# Patient Record
Sex: MALE | Race: BLACK OR AFRICAN AMERICAN | NOT HISPANIC OR LATINO | Employment: FULL TIME | URBAN - METROPOLITAN AREA
[De-identification: names, ages, dates, MRNs, and addresses within clinical notes are randomized per-mention and may not be internally consistent; named-entity substitution may affect disease eponyms.]

---

## 2017-01-16 DIAGNOSIS — N40.0 BENIGN PROSTATIC HYPERPLASIA, PRESENCE OF LOWER URINARY TRACT SYMPTOMS UNSPECIFIED, UNSPECIFIED MORPHOLOGY: Primary | ICD-10-CM

## 2017-01-16 DIAGNOSIS — I10 ESSENTIAL HYPERTENSION: ICD-10-CM

## 2017-01-16 RX ORDER — TAMSULOSIN HYDROCHLORIDE 0.4 MG/1
CAPSULE ORAL
Qty: 180 CAPSULE | Refills: 3 | Status: SHIPPED | OUTPATIENT
Start: 2017-01-16 | End: 2017-05-15 | Stop reason: SDUPTHER

## 2017-01-16 RX ORDER — VERAPAMIL HYDROCHLORIDE 180 MG/1
180 CAPSULE, EXTENDED RELEASE ORAL DAILY
Qty: 90 CAPSULE | Refills: 3 | Status: SHIPPED | OUTPATIENT
Start: 2017-01-16 | End: 2017-05-15 | Stop reason: SDUPTHER

## 2017-04-04 ENCOUNTER — TELEPHONE (OUTPATIENT)
Dept: FAMILY MEDICINE | Facility: CLINIC | Age: 72
End: 2017-04-04

## 2017-04-04 NOTE — TELEPHONE ENCOUNTER
Spoke with pt wife he is having surgery.   Went to eye specialist scheduled him for laser surgery because eye pressure is too high.

## 2017-04-04 NOTE — TELEPHONE ENCOUNTER
----- Message from Violette Donnelly sent at 4/4/2017  7:42 AM CDT -----  Please call pt 's wife Dena Mattson / [phone]  Asking to speak to nurse about scheduling an appointment for eye surgery ..(not cataract surgery) for 04/18 th

## 2017-04-07 ENCOUNTER — OFFICE VISIT (OUTPATIENT)
Dept: FAMILY MEDICINE | Facility: CLINIC | Age: 72
End: 2017-04-07
Payer: MEDICARE

## 2017-04-07 VITALS
RESPIRATION RATE: 16 BRPM | SYSTOLIC BLOOD PRESSURE: 110 MMHG | HEART RATE: 80 BPM | WEIGHT: 205.25 LBS | DIASTOLIC BLOOD PRESSURE: 70 MMHG | BODY MASS INDEX: 30.31 KG/M2

## 2017-04-07 DIAGNOSIS — Z01.818 PRE-OP EXAM: Primary | ICD-10-CM

## 2017-04-07 PROCEDURE — 1126F AMNT PAIN NOTED NONE PRSNT: CPT | Mod: S$GLB,,, | Performed by: NURSE PRACTITIONER

## 2017-04-07 PROCEDURE — 99213 OFFICE O/P EST LOW 20 MIN: CPT | Mod: S$GLB,,, | Performed by: NURSE PRACTITIONER

## 2017-04-07 PROCEDURE — 3074F SYST BP LT 130 MM HG: CPT | Mod: S$GLB,,, | Performed by: NURSE PRACTITIONER

## 2017-04-07 PROCEDURE — 1160F RVW MEDS BY RX/DR IN RCRD: CPT | Mod: S$GLB,,, | Performed by: NURSE PRACTITIONER

## 2017-04-07 PROCEDURE — 99499 UNLISTED E&M SERVICE: CPT | Mod: S$GLB,,, | Performed by: NURSE PRACTITIONER

## 2017-04-07 PROCEDURE — 3078F DIAST BP <80 MM HG: CPT | Mod: S$GLB,,, | Performed by: NURSE PRACTITIONER

## 2017-04-07 PROCEDURE — 99999 PR PBB SHADOW E&M-EST. PATIENT-LVL II: CPT | Mod: PBBFAC,,, | Performed by: NURSE PRACTITIONER

## 2017-04-07 PROCEDURE — 1159F MED LIST DOCD IN RCRD: CPT | Mod: S$GLB,,, | Performed by: NURSE PRACTITIONER

## 2017-04-07 PROCEDURE — 1157F ADVNC CARE PLAN IN RCRD: CPT | Mod: S$GLB,,, | Performed by: NURSE PRACTITIONER

## 2017-04-07 NOTE — PROGRESS NOTES
Subjective:       Patient ID: Jayro Mattson is a 71 y.o. male.    Chief Complaint: Pre-op Exam (eye laser surgery)    HPI Comments: Patient is having lazer eye surgery by Dr Estrada in [place] on 4/18/17. He is here for pre operative clearance. Patient is compliant with verapamil for htn, stable. He has BPH, compliant with listed medications, stable symptoms.  TETANUS VACCINE due on 08/10/1965    Past Medical History:  Past Medical History:  No date: Allergy  No date: BPH (benign prostatic hyperplasia)  No date: Diverticulitis  3/17/2006: Diverticulosis      Comment: Seen at colonoscopy.  No date: Elevated PSA  No date: Glaucoma  No date: HEARING LOSS  No date: Hypertension  No date: Kidney stone  Past Surgical History:  3/17/2006  G[facility]: COLONOSCOPY      Comment: Diverticulosis.  Anal papilla(e) were                hypertrophied.   4/16/2012  [facility]: COLONOSCOPY      Comment: Diverticulosis.  Hypertrophied anal papilla.  No date: CYSTOSCOPY  No date: EYE SURGERY      Comment: bilateral PHACO with IOL  No date: GLAUCOMA SURGERY      Comment: x 3/ one with cataract surgery  Review of patient's allergies indicates:   -- No known allergies   Current Outpatient Prescriptions on File Prior to Visit:  bimatoprost (LUMIGAN) 0.03 % ophthalmic drops, Place 1 drop into both eyes nightly. Every evening, Disp: , Rfl:   brimonidine (ALPHAGAN) 0.2 % ophthalmic solution, Place 1 drop into both eyes 3 (three) times daily. Three times a day, Disp: , Rfl:   cholecalciferol, vitamin D3, (VITAMIN D-3) 1,000 unit Chew, Take 1 tablet by mouth once daily. Every day, Disp: , Rfl:   coenzyme Q10 (CO Q-10) 100 mg capsule, Take 1 capsule by mouth 2 (two) times daily. Every day, Disp: , Rfl:   dorzolamide-timolol (COSOPT) 2-0.5 % ophthalmic solution, Place 1 drop into both eyes 2 (two) times daily. Three times a day, Disp: , Rfl:   fish oil-omega-3 fatty acids 300-1,000 mg capsule, Take 2 g by mouth once daily., Disp: , Rfl:   latanoprost  0.005 % ophthalmic solution, , Disp: , Rfl:   pilocarpine (PILOCAR) 4 % ophthalmic solution, Place 1 drop into both eyes 3 (three) times daily. Three times a day, Disp: , Rfl:   tadalafil (CIALIS) 20 MG tablet, Take 1 tablet (20 mg total) by mouth daily as needed for Erectile Dysfunction., Disp: 6 tablet, Rfl: 4  tamsulosin (FLOMAX) 0.4 mg Cp24, TAKE 2 CAPSULES NIGHTLY, Disp: 180 capsule, Rfl: 3  verapamil (VERELAN) 180 MG C24P, Take 1 capsule (180 mg total) by mouth once daily., Disp: 90 capsule, Rfl: 3    No current facility-administered medications on file prior to visit.     Social History    Marital status:              Spouse name:                       Years of education:                 Number of children: 3             Occupational History  Occupation          Employer            Comment               Nursery ([employer] [facility]     Social History Main Topics    Smoking status: Never Smoker                                                                Smokeless status: Never Used                        Alcohol use: No              Drug use: No              Sexual activity: Yes               Partners with: Female    Other Topics            Concern    None on file    Social History Narrative    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 He and his wife are very aware of the need for ongoing glaucoma care.      Review of patient's family history indicates:    Diabetes                       Mother                    Diabetes                       Father                    Heart disease                  Brother                   Diabetes                       Sister                          Review of Systems   Constitutional: Negative.  Negative for chills and fever.   HENT: Negative.  Negative for postnasal drip, rhinorrhea and sinus pressure.    Eyes: Positive for visual disturbance. Negative for pain, discharge and itching.   Respiratory: Negative.  Negative for cough, shortness of breath  and wheezing.    Cardiovascular: Negative.  Negative for chest pain, palpitations and leg swelling.   Gastrointestinal: Negative for abdominal pain, constipation, diarrhea, nausea and vomiting.   Genitourinary: Negative.  Negative for dysuria, frequency and urgency.   Musculoskeletal: Negative.    Skin: Negative.  Negative for rash.   Neurological: Negative for dizziness, light-headedness and headaches.   Hematological: Negative.  Negative for adenopathy. Does not bruise/bleed easily.       Objective:      Physical Exam   Constitutional: He is oriented to person, place, and time. No distress.   HENT:   Head: Normocephalic and atraumatic.   Right Ear: External ear normal.   Left Ear: External ear normal.   Nose: Nose normal.   Mouth/Throat: Oropharynx is clear and moist. No oropharyngeal exudate.   Eyes: Right eye exhibits no discharge. Left eye exhibits no discharge.   Neck: Normal range of motion.   Cardiovascular: Normal rate and regular rhythm.  Exam reveals no friction rub.    No murmur heard.  Pulmonary/Chest: Effort normal and breath sounds normal. No respiratory distress. He has no wheezes.   Abdominal: Soft. Bowel sounds are normal. He exhibits no distension. There is no tenderness.   Musculoskeletal: He exhibits no edema.   Neurological: He is alert and oriented to person, place, and time.   Skin: No rash noted. He is not diaphoretic. No erythema.   Psychiatric: He has a normal mood and affect. His behavior is normal.   Vitals reviewed.      Assessment:       1. Pre-op exam        Plan:       Stable exam. Cleared for local/mac anesthesia. Clearance faxed to surgeon.

## 2017-05-15 ENCOUNTER — OFFICE VISIT (OUTPATIENT)
Dept: FAMILY MEDICINE | Facility: CLINIC | Age: 72
End: 2017-05-15
Payer: MEDICARE

## 2017-05-15 VITALS
SYSTOLIC BLOOD PRESSURE: 132 MMHG | OXYGEN SATURATION: 98 % | BODY MASS INDEX: 28.77 KG/M2 | HEART RATE: 79 BPM | WEIGHT: 205.5 LBS | HEIGHT: 71 IN | DIASTOLIC BLOOD PRESSURE: 82 MMHG | RESPIRATION RATE: 18 BRPM

## 2017-05-15 DIAGNOSIS — I10 ESSENTIAL HYPERTENSION: ICD-10-CM

## 2017-05-15 DIAGNOSIS — R97.20 ELEVATED PSA: Chronic | ICD-10-CM

## 2017-05-15 DIAGNOSIS — N40.0 BENIGN PROSTATIC HYPERPLASIA, PRESENCE OF LOWER URINARY TRACT SYMPTOMS UNSPECIFIED, UNSPECIFIED MORPHOLOGY: ICD-10-CM

## 2017-05-15 DIAGNOSIS — Z00.00 ENCOUNTER FOR PREVENTIVE HEALTH EXAMINATION: Primary | ICD-10-CM

## 2017-05-15 DIAGNOSIS — H40.9 GLAUCOMA, UNSPECIFIED GLAUCOMA, UNSPECIFIED LATERALITY: Chronic | ICD-10-CM

## 2017-05-15 PROCEDURE — 99999 PR PBB SHADOW E&M-EST. PATIENT-LVL IV: CPT | Mod: PBBFAC,,, | Performed by: NURSE PRACTITIONER

## 2017-05-15 PROCEDURE — 3075F SYST BP GE 130 - 139MM HG: CPT | Mod: S$GLB,,, | Performed by: NURSE PRACTITIONER

## 2017-05-15 PROCEDURE — G0439 PPPS, SUBSEQ VISIT: HCPCS | Mod: S$GLB,,, | Performed by: NURSE PRACTITIONER

## 2017-05-15 PROCEDURE — 99499 UNLISTED E&M SERVICE: CPT | Mod: S$GLB,,, | Performed by: NURSE PRACTITIONER

## 2017-05-15 PROCEDURE — 3079F DIAST BP 80-89 MM HG: CPT | Mod: S$GLB,,, | Performed by: NURSE PRACTITIONER

## 2017-05-15 RX ORDER — TAMSULOSIN HYDROCHLORIDE 0.4 MG/1
CAPSULE ORAL
Qty: 180 CAPSULE | Refills: 3 | Status: SHIPPED | OUTPATIENT
Start: 2017-05-15 | End: 2018-07-02 | Stop reason: SDUPTHER

## 2017-05-15 RX ORDER — VERAPAMIL HYDROCHLORIDE 180 MG/1
180 CAPSULE, EXTENDED RELEASE ORAL DAILY
Qty: 90 CAPSULE | Refills: 3 | Status: SHIPPED | OUTPATIENT
Start: 2017-05-15 | End: 2018-07-02 | Stop reason: SDUPTHER

## 2017-05-15 NOTE — MR AVS SNAPSHOT
[facility]  [address]  [place] [address]  Phone: [phone]  Fax: [phone]                  Jayro Mattson   5/15/2017 3:00 PM   Office Visit    Description:  Male : 1945   Provider:  BECK REILLY   Department:  [facility]           Reason for Visit     Health Risk Assessment           Diagnoses this Visit        Comments    Encounter for preventive health examination    -  Primary     Benign prostatic hyperplasia, presence of lower urinary tract symptoms unspecified, unspecified morphology         Essential hypertension         Elevated PSA         Glaucoma, unspecified glaucoma, unspecified laterality                To Do List           Goals (5 Years of Data)              Today    4/7/17    11/10/16    Blood Pressure < 130/80   132/82  132/82  132/82       These Medications        Disp Refills Start End    tamsulosin (FLOMAX) 0.4 mg Cp24 180 capsule 3 5/15/2017     TAKE 2 CAPSULES NIGHTLY    Pharmacy: [facility] [address] Ph #: [phone]       verapamil (VERELAN) 180 MG C24P 90 capsule 3 5/15/2017     Take 1 capsule (180 mg total) by mouth once daily. - Oral    Pharmacy: [facility] [address] Ph #: [phone]         [facility]     [facility] -  Assistance  Unless otherwise directed by your provider, please contact [facility], our nurse care line that is available for  assistance.     Registered nurses in the [facility] provide: appointment scheduling, clinical advisement, health education, and other advisory services.  Call: [phone] (toll free)               Medications           Message regarding Medications     Verify the changes and/or additions to your medication regime listed below are the same as discussed with your clinician today.  If any of these changes or additions are  "incorrect, please notify your healthcare provider.             Verify that the below list of medications is an accurate representation of the medications you are currently taking.  If none reported, the list may be blank. If incorrect, please contact your healthcare provider. Carry this list with you in case of emergency.           Current Medications     bimatoprost (LUMIGAN) 0.03 % ophthalmic drops Place 1 drop into both eyes nightly. Every evening    brimonidine (ALPHAGAN) 0.2 % ophthalmic solution Place 1 drop into both eyes 3 (three) times daily. Three times a day    cholecalciferol, vitamin D3, (VITAMIN D-3) 1,000 unit Chew Take 1 tablet by mouth once daily. Every day    coenzyme Q10 (CO Q-10) 100 mg capsule Take 1 capsule by mouth 2 (two) times daily. Every day    dorzolamide-timolol (COSOPT) 2-0.5 % ophthalmic solution Place 1 drop into both eyes 2 (two) times daily. Three times a day    fish oil-omega-3 fatty acids 300-1,000 mg capsule Take 2 g by mouth once daily.    latanoprost 0.005 % ophthalmic solution     pilocarpine (PILOCAR) 4 % ophthalmic solution Place 1 drop into both eyes 3 (three) times daily. Three times a day    tadalafil (CIALIS) 20 MG tablet Take 1 tablet (20 mg total) by mouth daily as needed for Erectile Dysfunction.    tamsulosin (FLOMAX) 0.4 mg Cp24 TAKE 2 CAPSULES NIGHTLY    verapamil (VERELAN) 180 MG C24P Take 1 capsule (180 mg total) by mouth once daily.           Clinical Reference Information           Your Vitals Were     BP Pulse Resp Height Weight SpO2    132/82 79 18 5' 11" (1.803 m) 93.2 kg (205 lb 7.5 oz) 98%    BMI                28.66 kg/m2          Blood Pressure          Most Recent Value    BP  132/82      Allergies as of 5/15/2017     No Known Allergies      Immunizations Administered on Date of Encounter - 5/15/2017     None      Instructions      Counseling and Referral of Other Preventative  (Italic type indicates deductible and co-insurance are waived)    Patient " Name: Jayro Mattson  Today's Date: 5/15/2017      SERVICE LIMITATIONS RECOMMENDATION    Vaccines    · Pneumococcal (once after 65)    · Influenza (annually)    · Hepatitis B (if medium/high risk)    · Prevnar 13      Hepatitis B medium/high risk factors:       - End-stage renal disease       - Hemophiliacs who received Factor VII or         IX concentrates       - Clients of institutions for the mentally             retarded       - Persons who live in the same house as          a HepB carrier       - Homosexual men       - Illicit injectable drug abusers     Pneumococcal: Done, no repeat necessary     Influenza: Done, repeat in one year     Hepatitis B: N/A     Prevnar 13: Done, no repeat necessary    Prostate cancer screening (annually to age 75)     Prostate specific antigen (PSA) Shared decision making with Provider. Sometimes a co-pay may be required if the patient decides to have this test. The USPSTF no longer recommends prostate cancer screening routinely in medicine: not to follow    Colorectal cancer screening (to age 75)    · Fecal occult blood test (annual)  · Flexible sigmoidoscopy (5y)  · Screening colonoscopy (10y)  · Barium enema   Last done 2012, recommend to repeat every 8 years  2020    Diabetes self-management training (no USPSTF recommendations)  Requires referral by treating physician for patient with diabetes or renal disease. 10 hours of initial DSMT sessions of no less than 30 minutes each in a continuous 12-month period. 2 hours of follow-up DSMT in subsequent years.  N/A    Glaucoma screening (no USPSTF recommendation)  Diabetes mellitus, family history   , age 50 or over    American, age 65 or over  Last done 5/2017, recommend to repeat every month  6/2017    Medical nutrition therapy for diabetes or renal disease (no recommended schedule)  Requires referral by treating physician for patient with diabetes or renal disease or kidney transplant within the past 3  years.  Can be provided in same year as diabetes self-management training (DSMT), and CMS recommends medical nutrition therapy take place after DSMT. Up to 3 hours for initial year and 2 hours in subsequent years.  N/A    Cardiovascular screening blood tests (every 5 years)  · Fasting lipid panel  Order as a panel if possible  Last done 2015, recommend to repeat every 5 years  2020    Diabetes screening tests (at least every 3 years, Medicare covers annually or at 6-month intervals for prediabetic patients)  · Fasting blood sugar (FBS) or glucose tolerance test (GTT)  Patient must be diagnosed with one of the following:       - Hypertension       - Dyslipidemia       - Obesity (BMI 30kg/m2)       - Previous elevated impaired FBS or GTT       ... or any two of the following:       - Overweight (BMI 25 but <30)       - Family history of diabetes       - Age 65 or older       - History of gestational diabetes or birth of baby weighing more than 9 pounds  Done this year, repeat every year    Abdominal aortic aneurysm screening (once)  · Sonogram   Limited to patients who meet one of the following criteria:       - Men who are 65-75 years old and have smoked more than 100 cigarette in their lifetime       - Anyone with a family history of abdominal aortic aneurysm       - Anyone recommended for screening by the USPSTF  N/A    HIV screening (annually for increased risk patients)  · HIV-1 and HIV-2 by EIA, or [place], rapid antibody test or oral mucosa transudate  Patients must be at increased risk for HIV infection per USPSTF guidelines or pregnant. Tests covered annually for patient at increased risk or as requested by the patient. Pregnant patients may receive up to 3 tests during pregnancy.  Risks discussed, screening is not recommended    Smoking cessation counseling (up to 8 sessions per year)  Patients must be asymptomatic of tobacco-related conditions to receive as a preventative service.  Non-smoker    Subsequent  annual wellness visit  At least 12 months since last AWV  Return in one year     The following information is provided to all patients.  This information is to help you find resources for any of the problems found today that may be affecting your health:                Living healthy guide: www.[facility].[place].[facility]      Understanding Diabetes: www.diabetes.org      Eating healthy: www.cdc.gov/healthyweight      CDC home safety checklist: www.cdc.gov/steadi/patient.html      Agency on Aging: www.goea.[place].[facility]      Alcoholics anonymous (AA): www.aa.org      Physical Activity: www.riki.nih.gov/qo6brxz      Tobacco use: www.quitwithusla.org          Language Assistance Services     ATTENTION: Language assistance services are available, free of charge. Please call [phone].      ATENCIÓN: Si lesla jamie, tiene a gleason disposición servicios gratuitos de asistencia lingüística. Llame al [phone].     CHÚ Ý: N?u b?n nói Ti?ng Vi?t, có các d?ch v? h? tr? ngôn ng? mi?n phí dành cho b?n. G?i s? [phone].         [facility] complies with applicable Federal civil rights laws and does not discriminate on the basis of race, color, national origin, age, disability, or sex.

## 2017-05-15 NOTE — PROGRESS NOTES
"Jayro Mattson presented for a  Medicare AWV and comprehensive Health Risk Assessment today. The following components were reviewed and updated:    · Medical history  · Family History  · Social history  · Allergies and Current Medications  · Health Risk Assessment  · Health Maintenance  · Care Team     Review of Systems   Constitutional: Negative for chills, fever and malaise/fatigue.   Eyes: Positive for blurred vision and double vision.   Respiratory: Negative for cough, shortness of breath and wheezing.    Cardiovascular: Negative for chest pain, palpitations and leg swelling.   Gastrointestinal: Negative for abdominal pain, blood in stool, diarrhea, nausea and vomiting.   Skin: Negative for rash.   Neurological: Negative for dizziness, weakness and headaches.       ** See Completed Assessments for Annual Wellness Visit within the encounter summary.**       The following assessments were completed:  · Living Situation  · CAGE  · Depression Screening  · Timed Get Up and Go  · Whisper Test  · Cognitive Function Screening  · Nutrition Screening  · ADL Screening  · PAQ Screening    Vitals:    05/15/17 1500   BP: 132/82   Pulse: 79   Resp: 18   SpO2: 98%   Weight: 93.2 kg (205 lb 7.5 oz)   Height: 5' 11" (1.803 m)     Body mass index is 28.66 kg/(m^2).  Physical Exam   Constitutional: He is oriented to person, place, and time. He appears well-nourished.   Cardiovascular: Normal rate, regular rhythm, normal heart sounds and intact distal pulses.    Pulmonary/Chest: Effort normal and breath sounds normal. He has no wheezes. He has no rales.   Abdominal: Soft. Bowel sounds are normal. There is no tenderness.   Neurological: He is alert and oriented to person, place, and time.   Skin: Skin is warm and dry. No rash noted.   Vitals reviewed.        Diagnoses and health risks identified today and associated recommendations/orders:    1. Encounter for preventive health examination  Written rx given to patient to update " tetanus  Discussed preventative recommendations    2. Benign prostatic hyperplasia, presence of lower urinary tract symptoms unspecified, unspecified morphology  Stable- continue current treatment  - tamsulosin (FLOMAX) 0.4 mg Cp24; TAKE 2 CAPSULES NIGHTLY  Dispense: 180 capsule; Refill: 3    3. Essential hypertension  Stable- continue current treatment  - verapamil (VERELAN) 180 MG C24P; Take 1 capsule (180 mg total) by mouth once daily.  Dispense: 90 capsule; Refill: 3    4. Elevated PSA  Stable- PSA 10-/2016 (6.8)  Follow up routinely    5. Glaucoma, unspecified glaucoma, unspecified laterality  Continue follow up with Dr. Jnaice Estrada as scheduled.     Provided Jayro with a 5-10 year written screening schedule and personal prevention plan. Recommendations were developed using the USPSTF age appropriate recommendations. Education, counseling, and referrals were provided as needed. After Visit Summary printed and given to patient which includes a list of additional screenings\tests needed.      Caitlin Lora NP

## 2017-05-15 NOTE — PATIENT INSTRUCTIONS
Counseling and Referral of Other Preventative  (Italic type indicates deductible and co-insurance are waived)    Patient Name: Jayro Mattson  Today's Date: 5/15/2017      SERVICE LIMITATIONS RECOMMENDATION    Vaccines    · Pneumococcal (once after 65)    · Influenza (annually)    · Hepatitis B (if medium/high risk)    · Prevnar 13      Hepatitis B medium/high risk factors:       - End-stage renal disease       - Hemophiliacs who received Factor VII or         IX concentrates       - Clients of institutions for the mentally             retarded       - Persons who live in the same house as          a HepB carrier       - Homosexual men       - Illicit injectable drug abusers     Pneumococcal: Done, no repeat necessary     Influenza: Done, repeat in one year     Hepatitis B: N/A     Prevnar 13: Done, no repeat necessary    Prostate cancer screening (annually to age 75)     Prostate specific antigen (PSA) Shared decision making with Provider. Sometimes a co-pay may be required if the patient decides to have this test. The USPSTF no longer recommends prostate cancer screening routinely in medicine: not to follow    Colorectal cancer screening (to age 75)    · Fecal occult blood test (annual)  · Flexible sigmoidoscopy (5y)  · Screening colonoscopy (10y)  · Barium enema   Last done 2012, recommend to repeat every 8 years  2020    Diabetes self-management training (no USPSTF recommendations)  Requires referral by treating physician for patient with diabetes or renal disease. 10 hours of initial DSMT sessions of no less than 30 minutes each in a continuous 12-month period. 2 hours of follow-up DSMT in subsequent years.  N/A    Glaucoma screening (no USPSTF recommendation)  Diabetes mellitus, family history   , age 50 or over    American, age 65 or over  Last done 5/2017, recommend to repeat every month  6/2017    Medical nutrition therapy for diabetes or renal disease (no recommended schedule)   Requires referral by treating physician for patient with diabetes or renal disease or kidney transplant within the past 3 years.  Can be provided in same year as diabetes self-management training (DSMT), and CMS recommends medical nutrition therapy take place after DSMT. Up to 3 hours for initial year and 2 hours in subsequent years.  N/A    Cardiovascular screening blood tests (every 5 years)  · Fasting lipid panel  Order as a panel if possible  Last done 2015, recommend to repeat every 5 years  2020    Diabetes screening tests (at least every 3 years, Medicare covers annually or at 6-month intervals for prediabetic patients)  · Fasting blood sugar (FBS) or glucose tolerance test (GTT)  Patient must be diagnosed with one of the following:       - Hypertension       - Dyslipidemia       - Obesity (BMI 30kg/m2)       - Previous elevated impaired FBS or GTT       ... or any two of the following:       - Overweight (BMI 25 but <30)       - Family history of diabetes       - Age 65 or older       - History of gestational diabetes or birth of baby weighing more than 9 pounds  Done this year, repeat every year    Abdominal aortic aneurysm screening (once)  · Sonogram   Limited to patients who meet one of the following criteria:       - Men who are 65-75 years old and have smoked more than 100 cigarette in their lifetime       - Anyone with a family history of abdominal aortic aneurysm       - Anyone recommended for screening by the USPSTF  N/A    HIV screening (annually for increased risk patients)  · HIV-1 and HIV-2 by EIA, or [place], rapid antibody test or oral mucosa transudate  Patients must be at increased risk for HIV infection per USPSTF guidelines or pregnant. Tests covered annually for patient at increased risk or as requested by the patient. Pregnant patients may receive up to 3 tests during pregnancy.  Risks discussed, screening is not recommended    Smoking cessation counseling (up to 8 sessions per year)   Patients must be asymptomatic of tobacco-related conditions to receive as a preventative service.  Non-smoker    Subsequent annual wellness visit  At least 12 months since last AWV  Return in one year     The following information is provided to all patients.  This information is to help you find resources for any of the problems found today that may be affecting your health:                Living healthy guide: www.[facility].[place].[facility]      Understanding Diabetes: www.diabetes.org      Eating healthy: www.cdc.gov/healthyweight      CDC home safety checklist: www.cdc.gov/steadi/patient.html      Agency on Aging: www.goea.[place].[facility]      Alcoholics anonymous (AA): www.aa.org      Physical Activity: www.riki.nih.gov/je2mduu      Tobacco use: www.quitwithusla.org

## 2018-05-15 ENCOUNTER — OFFICE VISIT (OUTPATIENT)
Dept: FAMILY MEDICINE | Facility: CLINIC | Age: 73
End: 2018-05-15
Payer: MEDICARE

## 2018-05-15 VITALS
BODY MASS INDEX: 27.9 KG/M2 | DIASTOLIC BLOOD PRESSURE: 72 MMHG | WEIGHT: 199.31 LBS | HEIGHT: 71 IN | OXYGEN SATURATION: 98 % | SYSTOLIC BLOOD PRESSURE: 118 MMHG | HEART RATE: 72 BPM

## 2018-05-15 DIAGNOSIS — H40.9 GLAUCOMA, UNSPECIFIED GLAUCOMA TYPE, UNSPECIFIED LATERALITY: Chronic | ICD-10-CM

## 2018-05-15 DIAGNOSIS — I10 ESSENTIAL HYPERTENSION: Chronic | ICD-10-CM

## 2018-05-15 DIAGNOSIS — R97.20 ELEVATED PSA: Chronic | ICD-10-CM

## 2018-05-15 DIAGNOSIS — Z00.00 ENCOUNTER FOR PREVENTIVE HEALTH EXAMINATION: Primary | ICD-10-CM

## 2018-05-15 PROCEDURE — G0439 PPPS, SUBSEQ VISIT: HCPCS | Mod: S$GLB,,, | Performed by: NURSE PRACTITIONER

## 2018-05-15 PROCEDURE — 3074F SYST BP LT 130 MM HG: CPT | Mod: CPTII,S$GLB,, | Performed by: NURSE PRACTITIONER

## 2018-05-15 PROCEDURE — 99999 PR PBB SHADOW E&M-EST. PATIENT-LVL IV: CPT | Mod: PBBFAC,,, | Performed by: NURSE PRACTITIONER

## 2018-05-15 PROCEDURE — 3078F DIAST BP <80 MM HG: CPT | Mod: CPTII,S$GLB,, | Performed by: NURSE PRACTITIONER

## 2018-05-15 PROCEDURE — 99499 UNLISTED E&M SERVICE: CPT | Mod: S$PBB,,, | Performed by: NURSE PRACTITIONER

## 2018-05-15 RX ORDER — LUTEIN 10 MG
TABLET ORAL
COMMUNITY
End: 2020-08-26

## 2018-05-15 NOTE — PROGRESS NOTES
"Jayro Mattson presented for a  Medicare AWV and comprehensive Health Risk Assessment today. The following components were reviewed and updated:    · Medical history  · Family History  · Social history  · Allergies and Current Medications  · Health Risk Assessment  · Health Maintenance  · Care Team     ** See Completed Assessments for Annual Wellness Visit within the encounter summary.**       The following assessments were completed:  · Living Situation  · CAGE  · Depression Screening  · Timed Get Up and Go  · Whisper Test  · Cognitive Function Screening  · Nutrition Screening  · ADL Screening  · PAQ Screening    Vitals:    05/15/18 1006   BP: 118/72   BP Location: Left arm   Patient Position: Sitting   BP Method: Medium (Manual)   Pulse: 72   SpO2: 98%   Weight: 90.4 kg (199 lb 4.7 oz)   Height: 5' 11" (1.803 m)     Body mass index is 27.8 kg/m².  Physical Exam   Constitutional: He appears well-nourished. No distress.   HENT:   Head: Normocephalic and atraumatic.   Eyes: Conjunctivae are normal.   Severely impaired vision   Cardiovascular: Normal rate, regular rhythm and normal heart sounds.    Pulmonary/Chest: Effort normal and breath sounds normal. No respiratory distress.   Neurological: He is alert.   Skin: Skin is warm.   Psychiatric: He has a normal mood and affect. His behavior is normal.         Diagnoses and health risks identified today and associated recommendations/orders:    1. Encounter for preventive health examination  Reviewed health maintenance and provided recommendations        2. Glaucoma, unspecified glaucoma type, unspecified laterality  Follow ophthalmology recommendations   Followed by Sean Thornton).      3. Essential hypertension  Stable.     Followed by Franko Salas Jr, MD .      4. Elevated PSA  Continue to monitor   Followed by Franko Salas Jr, MD .        Provided Jayro with a 5-10 year written screening schedule and personal prevention plan. Recommendations were " developed using the USPSTF age appropriate recommendations. Education, counseling, and referrals were provided as needed. After Visit Summary printed and given to patient which includes a list of additional screenings\tests needed.    Follow-up in about 1 year (around 5/15/2019).    Mely Triana NP

## 2018-07-02 DIAGNOSIS — N40.0 BENIGN PROSTATIC HYPERPLASIA: ICD-10-CM

## 2018-07-02 DIAGNOSIS — I10 ESSENTIAL HYPERTENSION: ICD-10-CM

## 2018-07-09 RX ORDER — VERAPAMIL HYDROCHLORIDE 180 MG/1
CAPSULE, EXTENDED RELEASE ORAL
Qty: 90 CAPSULE | Refills: 3 | Status: SHIPPED | OUTPATIENT
Start: 2018-07-09 | End: 2019-03-20 | Stop reason: SDUPTHER

## 2018-07-09 RX ORDER — TAMSULOSIN HYDROCHLORIDE 0.4 MG/1
CAPSULE ORAL
Qty: 180 CAPSULE | Refills: 3 | Status: SHIPPED | OUTPATIENT
Start: 2018-07-09 | End: 2019-03-20 | Stop reason: SDUPTHER

## 2018-08-02 ENCOUNTER — OFFICE VISIT (OUTPATIENT)
Dept: FAMILY MEDICINE | Facility: CLINIC | Age: 73
End: 2018-08-02
Payer: MEDICARE

## 2018-08-02 VITALS
SYSTOLIC BLOOD PRESSURE: 104 MMHG | DIASTOLIC BLOOD PRESSURE: 70 MMHG | BODY MASS INDEX: 27.44 KG/M2 | WEIGHT: 196 LBS | OXYGEN SATURATION: 96 % | HEART RATE: 65 BPM | HEIGHT: 71 IN

## 2018-08-02 DIAGNOSIS — I10 ESSENTIAL HYPERTENSION: Primary | Chronic | ICD-10-CM

## 2018-08-02 DIAGNOSIS — R97.20 ELEVATED PSA: Chronic | ICD-10-CM

## 2018-08-02 DIAGNOSIS — R42 EPISODIC LIGHTHEADEDNESS: ICD-10-CM

## 2018-08-02 DIAGNOSIS — Z01.818 PRE-OP EXAM: ICD-10-CM

## 2018-08-02 LAB
BLOOD PRESSURE MONITORING: NORMAL

## 2018-08-02 PROCEDURE — 3078F DIAST BP <80 MM HG: CPT | Mod: CPTII,S$GLB,, | Performed by: NURSE PRACTITIONER

## 2018-08-02 PROCEDURE — 99999 PR PBB SHADOW E&M-EST. PATIENT-LVL IV: CPT | Mod: PBBFAC,,, | Performed by: NURSE PRACTITIONER

## 2018-08-02 PROCEDURE — 3074F SYST BP LT 130 MM HG: CPT | Mod: CPTII,S$GLB,, | Performed by: NURSE PRACTITIONER

## 2018-08-02 PROCEDURE — 99214 OFFICE O/P EST MOD 30 MIN: CPT | Mod: S$GLB,,, | Performed by: NURSE PRACTITIONER

## 2018-08-02 PROCEDURE — 93000 ELECTROCARDIOGRAM COMPLETE: CPT | Mod: S$GLB,,, | Performed by: INTERNAL MEDICINE

## 2018-08-02 RX ORDER — PILOCARPINE HYDROCHLORIDE 20 MG/ML
SOLUTION/ DROPS OPHTHALMIC
COMMUNITY
Start: 2018-06-04 | End: 2019-11-21

## 2018-08-02 RX ORDER — DUREZOL 0.5 MG/ML
EMULSION OPHTHALMIC
COMMUNITY
Start: 2018-07-14 | End: 2023-05-18

## 2018-08-02 NOTE — PROGRESS NOTES
Subjective:       Patient ID: Jayro Mattson is a 72 y.o. male.    Chief Complaint: Pre-op Exam    Patient is here for pre operative clearance for eye surgery, levator ptosis repair scheduled on August 20.   Patient does notice dizziness when standing still and certain head position changes for months. Does not happen sitting or lying, does not happen when he is walking. Room does not spin but feels as if he is off balance. He says this is happeneing every day, some days frequently some days only once or twice.   No labs since 2016. Bp is on low end today, he does not monitor Bp at home, no recent medication changes.   TETANUS VACCINE due on 08/10/1965  Influenza Vaccine due on 08/01/2018    Past Medical History:  Past Medical History:  No date: Allergy  No date: BPH (benign prostatic hyperplasia)  No date: Diverticulitis  3/17/2006: Diverticulosis      Comment: Seen at colonoscopy.  No date: Elevated PSA  No date: Glaucoma  No date: HEARING LOSS  No date: Hypertension  No date: Kidney stone  Past Surgical History:  3/17/2006  [facility]: COLONOSCOPY      Comment: Diverticulosis.  Anal papilla(e) were                hypertrophied.   4/16/2012  [facility]: COLONOSCOPY      Comment: Diverticulosis.  Hypertrophied anal papilla.  No date: CYSTOSCOPY  No date: EYE SURGERY      Comment: bilateral PHACO with IOL  No date: GLAUCOMA SURGERY      Comment: x 3/ one with cataract surgery  Review of patient's allergies indicates:   -- No known allergies   Current Outpatient Prescriptions on File Prior to Visit:  bimatoprost (LUMIGAN) 0.03 % ophthalmic drops, Place 1 drop into both eyes nightly. Every evening, Disp: , Rfl:   brimonidine (ALPHAGAN) 0.2 % ophthalmic solution, Place 1 drop into both eyes 3 (three) times daily. Three times a day, Disp: , Rfl:   cholecalciferol, vitamin D3, (VITAMIN D-3) 1,000 unit Chew, Take 1 tablet by mouth once daily. Every day, Disp: , Rfl:   coenzyme Q10 (CO Q-10) 100 mg capsule, Take 1 capsule by mouth 2  (two) times daily. Every day, Disp: , Rfl:   dorzolamide-timolol (COSOPT) 2-0.5 % ophthalmic solution, Place 1 drop into both eyes 2 (two) times daily. Three times a day, Disp: , Rfl:   fish oil-omega-3 fatty acids 300-1,000 mg capsule, Take 2 g by mouth once daily., Disp: , Rfl:   latanoprost 0.005 % ophthalmic solution, , Disp: , Rfl:   lutein 10 mg Tab, Take by mouth., Disp: , Rfl:   magnesium oxide-Mg AA chelate (MG-PLUS-PROTEIN) 133 mg Tab, Take by mouth., Disp: , Rfl:   pilocarpine (PILOCAR) 4 % ophthalmic solution, Place 1 drop into both eyes 3 (three) times daily. Three times a day, Disp: , Rfl:   tadalafil (CIALIS) 20 MG tablet, Take 1 tablet (20 mg total) by mouth daily as needed for Erectile Dysfunction., Disp: 6 tablet, Rfl: 4  tamsulosin (FLOMAX) 0.4 mg Cp24, TAKE 2 CAPSULES NIGHTLY, Disp: 180 capsule, Rfl: 3  verapamil (VERELAN) 180 MG C24P, TAKE 1 CAPSULE ONE TIME DAILY, Disp: 90 capsule, Rfl: 3    No current facility-administered medications on file prior to visit.     Social History    Marital status:              Spouse name:                       Years of education:                 Number of children: 3             Occupational History  Occupation          Employer            Comment               Nursery ([employer]     Social History Main Topics    Smoking status: Never Smoker                                                                Smokeless tobacco: Never Used                        Alcohol use: No              Drug use: No              Sexual activity: Yes               Partners with: Female    Other Topics            Concern    None on file    Social History Narrative    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 He and his wife are very aware of the need for ongoing glaucoma care.      Review of patient's family history indicates:  Problem: Diabetes      Relation: Mother       Age of Onset: (Not Specified)   Problem: Diabetes      Relation: Father       Age  of Onset: (Not Specified)   Problem: Heart disease      Relation: Brother       Age of Onset: (Not Specified)   Problem: Diabetes      Relation: Sister       Age of Onset: (Not Specified)             Review of Systems   Constitutional: Negative.  Negative for fatigue, fever and unexpected weight change.   HENT: Positive for postnasal drip. Negative for rhinorrhea, sinus pain, sinus pressure and sore throat.    Respiratory: Negative.  Negative for cough, shortness of breath and wheezing.    Cardiovascular: Negative.  Negative for chest pain, palpitations and leg swelling.   Gastrointestinal: Negative.  Negative for abdominal pain, constipation, diarrhea, nausea and vomiting.   Genitourinary: Negative.  Negative for dysuria, frequency and urgency.   Musculoskeletal: Negative.  Negative for arthralgias, back pain and neck pain.   Skin: Negative.  Negative for rash and wound.   Neurological: Positive for dizziness, light-headedness and headaches. Negative for tremors and weakness.       Objective:      Physical Exam   Constitutional: He is oriented to person, place, and time. No distress.   HENT:   Head: Normocephalic and atraumatic.   Eyes: Pupils are equal, round, and reactive to light.   Cardiovascular: Normal rate and regular rhythm.  Exam reveals no friction rub.    No murmur heard.  Pulmonary/Chest: Effort normal and breath sounds normal. No respiratory distress. He has no wheezes.   Abdominal: Soft. Bowel sounds are normal.   Musculoskeletal: He exhibits no edema.   Neurological: He is alert and oriented to person, place, and time.   Skin: He is not diaphoretic. No erythema.   Psychiatric: He has a normal mood and affect. His behavior is normal.   Vitals reviewed.      Assessment:       1. Essential hypertension    2. Elevated PSA    3. Episodic lightheadedness    4. Pre-op exam        Plan:       1. Essential hypertension  Stable. Orthostatic Bps are stable. Continue current medication for now, labs  scheduled.  - CBC auto differential; Future  - Comprehensive metabolic panel; Future  - TSH; Future  - Lipid panel; Future  - IN OFFICE EKG 12-LEAD (to Muse)    2. Elevated PSA  Due for PSA.  - PROSTATE SPECIFIC ANTIGEN, DIAGNOSTIC; Future    3. Episodic lightheadedness  Orthostatic vital signs stable. Refer to ENt for evaluation.   - Ambulatory referral to ENT  - Orthostatic vital signs    4. Pre-op exam  Labs scheduled, ecg stable. Will clear for procedure once labs are reviewed.   - CBC auto differential; Future  - Comprehensive metabolic panel; Future  - IN OFFICE EKG 12-LEAD (to Muse)

## 2018-08-07 ENCOUNTER — LAB VISIT (OUTPATIENT)
Dept: LAB | Facility: HOSPITAL | Age: 73
End: 2018-08-07
Attending: NURSE PRACTITIONER
Payer: MEDICARE

## 2018-08-07 DIAGNOSIS — Z01.818 PRE-OP EXAM: ICD-10-CM

## 2018-08-07 DIAGNOSIS — I10 ESSENTIAL HYPERTENSION: Chronic | ICD-10-CM

## 2018-08-07 DIAGNOSIS — R97.20 ELEVATED PSA: Chronic | ICD-10-CM

## 2018-08-07 LAB
ALBUMIN SERPL BCP-MCNC: 3.7 G/DL
ALP SERPL-CCNC: 87 U/L
ALT SERPL W/O P-5'-P-CCNC: 11 U/L
ANION GAP SERPL CALC-SCNC: 7 MMOL/L
AST SERPL-CCNC: 16 U/L
BASOPHILS # BLD AUTO: 0.05 K/UL
BASOPHILS NFR BLD: 1.2 %
BILIRUB SERPL-MCNC: 0.4 MG/DL
BUN SERPL-MCNC: 24 MG/DL
CALCIUM SERPL-MCNC: 9.4 MG/DL
CHLORIDE SERPL-SCNC: 108 MMOL/L
CHOLEST SERPL-MCNC: 186 MG/DL
CHOLEST/HDLC SERPL: 3.6 {RATIO}
CO2 SERPL-SCNC: 27 MMOL/L
COMPLEXED PSA SERPL-MCNC: 6 NG/ML
CREAT SERPL-MCNC: 1 MG/DL
DIFFERENTIAL METHOD: ABNORMAL
EOSINOPHIL # BLD AUTO: 0.2 K/UL
EOSINOPHIL NFR BLD: 3.7 %
ERYTHROCYTE [DISTWIDTH] IN BLOOD BY AUTOMATED COUNT: 13.3 %
EST. GFR  (AFRICAN AMERICAN): >60 ML/MIN/1.73 M^2
EST. GFR  (NON AFRICAN AMERICAN): >60 ML/MIN/1.73 M^2
GLUCOSE SERPL-MCNC: 93 MG/DL
HCT VFR BLD AUTO: 38.3 %
HDLC SERPL-MCNC: 51 MG/DL
HDLC SERPL: 27.4 %
HGB BLD-MCNC: 12.8 G/DL
IMM GRANULOCYTES # BLD AUTO: 0.01 K/UL
IMM GRANULOCYTES NFR BLD AUTO: 0.2 %
LDLC SERPL CALC-MCNC: 124.2 MG/DL
LYMPHOCYTES # BLD AUTO: 1.2 K/UL
LYMPHOCYTES NFR BLD: 29.6 %
MCH RBC QN AUTO: 30.1 PG
MCHC RBC AUTO-ENTMCNC: 33.4 G/DL
MCV RBC AUTO: 90 FL
MONOCYTES # BLD AUTO: 0.4 K/UL
MONOCYTES NFR BLD: 9.4 %
NEUTROPHILS # BLD AUTO: 2.3 K/UL
NEUTROPHILS NFR BLD: 55.9 %
NONHDLC SERPL-MCNC: 135 MG/DL
NRBC BLD-RTO: 0 /100 WBC
PLATELET # BLD AUTO: 158 K/UL
PMV BLD AUTO: 11.6 FL
POTASSIUM SERPL-SCNC: 4.5 MMOL/L
PROT SERPL-MCNC: 7.5 G/DL
RBC # BLD AUTO: 4.25 M/UL
SODIUM SERPL-SCNC: 142 MMOL/L
TRIGL SERPL-MCNC: 54 MG/DL
TSH SERPL DL<=0.005 MIU/L-ACNC: 1.45 UIU/ML
WBC # BLD AUTO: 4.05 K/UL

## 2018-08-07 PROCEDURE — 85025 COMPLETE CBC W/AUTO DIFF WBC: CPT

## 2018-08-07 PROCEDURE — 84443 ASSAY THYROID STIM HORMONE: CPT

## 2018-08-07 PROCEDURE — 84153 ASSAY OF PSA TOTAL: CPT

## 2018-08-07 PROCEDURE — 80061 LIPID PANEL: CPT

## 2018-08-07 PROCEDURE — 36415 COLL VENOUS BLD VENIPUNCTURE: CPT | Mod: PO

## 2018-08-07 PROCEDURE — 80053 COMPREHEN METABOLIC PANEL: CPT

## 2018-08-15 ENCOUNTER — CLINICAL SUPPORT (OUTPATIENT)
Dept: AUDIOLOGY | Facility: CLINIC | Age: 73
End: 2018-08-15
Payer: MEDICARE

## 2018-08-15 ENCOUNTER — OFFICE VISIT (OUTPATIENT)
Dept: OTOLARYNGOLOGY | Facility: CLINIC | Age: 73
End: 2018-08-15
Payer: MEDICARE

## 2018-08-15 VITALS — BODY MASS INDEX: 27.44 KG/M2 | WEIGHT: 196 LBS | HEIGHT: 71 IN

## 2018-08-15 DIAGNOSIS — R42 DIZZINESS AND GIDDINESS: Primary | ICD-10-CM

## 2018-08-15 DIAGNOSIS — R26.89 IMBALANCE: Primary | ICD-10-CM

## 2018-08-15 PROCEDURE — 99213 OFFICE O/P EST LOW 20 MIN: CPT | Mod: S$GLB,,, | Performed by: OTOLARYNGOLOGY

## 2018-08-15 PROCEDURE — 99999 PR PBB SHADOW E&M-EST. PATIENT-LVL III: CPT | Mod: PBBFAC,,, | Performed by: OTOLARYNGOLOGY

## 2018-08-15 PROCEDURE — 92542 POSITIONAL NYSTAGMUS TEST: CPT | Mod: HCWC,S$GLB,, | Performed by: AUDIOLOGIST

## 2018-08-15 PROCEDURE — 99999 PR PBB SHADOW E&M-EST. PATIENT-LVL I: CPT | Mod: PBBFAC,HCWC,,

## 2018-08-15 NOTE — LETTER
August 15, 2018      [place]  [address]  [place] [address]           [place]  [address] [facility] [address] [place] [address]  Phone: [phone]  Fax: [phone]          Patient: Jayro Mattson   MR Number: [id number]   YOB: 1945   Date of Visit: 8/15/2018       Dear Avis Stoner:    Thank you for referring Jayro Mattson to me for evaluation. Attached you will find relevant portions of my assessment and plan of care.    If you have questions, please do not hesitate to call me. I look forward to following Jayro Mattson along with you.    Sincerely,    Han Monroy MD    Enclosure  CC:  No Recipients    If you would like to receive this communication electronically, please contact [email or web address] or [phone] to request more information on [facility] Link access.    For providers and/or their staff who would like to refer a patient to [facility], please contact us through our one-stop-shop provider referral line, [facility] , at [phone].    If you feel you have received this communication in error or would no longer like to receive these types of communications, please e-mail [email or web address]

## 2018-08-15 NOTE — PROGRESS NOTES
Subjective:       Patient ID: Jayro Mattson is a 73 y.o. male.    Chief Complaint: Dizziness    Jayro is here for dizzinesss/ motion issues. Symptoms have been present for months. Gradual onset. No preceding injury / MVA / inciting factors.   This is constant. He notices an imbalance issue when he is standing or walking. Difficult to maintain balance. When he closes eyes, things are worse.   No DM, he denies any sensory disturbances distally, but he has occasional  / hand strength issues where his hand will lock up and occasionally with his toes.  Glaucoma but this has been stable. Hearing is gradually getting worse. Previous hearing loss noted bilateral symmetric in 2016.     No tremors.        Social History     Tobacco Use   Smoking Status Never Smoker   Smokeless Tobacco Never Used     Social History     Substance and Sexual Activity   Alcohol Use No          Review of Systems   Constitutional: Negative for activity change and appetite change.   Eyes: Negative for discharge.   Respiratory: Negative for difficulty breathing and wheezing   Cardiovascular: Negative for chest pain.   Gastrointestinal: Negative for abdominal distention and abdominal pain.   Endocrine: Negative for cold intolerance and heat intolerance.   Genitourinary: Negative for dysuria.   Musculoskeletal: Negative for gait problem and joint swelling.   Skin: Negative for color change and pallor.   Neurological: Negative for syncope and weakness.   Psychiatric/Behavioral: Negative for agitation and confusion.     Objective:        Constitutional:   He is oriented to person, place, and time. He appears well-developed and well-nourished. He appears alert. He is active. Normal speech.      Head:  Normocephalic and atraumatic. Head is without TMJ tenderness. No scars. Salivary glands normal.  Facial strength is normal.      Ears:    Right Ear: No drainage or swelling. No middle ear effusion.   Left Ear: No drainage or swelling.  No middle ear  effusion.   Right Edwin-Hallpike - no vertigo, no rotary nystagmus   Left Edwin-Hallpike - no vertigo, no rotary nystagmus   Horizontal Canal testing negative  VAST testing negative      Nose:  No mucosal edema, rhinorrhea or sinus tenderness. No turbinate hypertrophy.      Mouth/Throat  Oropharynx clear and moist without lesions or asymmetry, normal uvula midline and mirror exam normal. Normal dentition. No uvula swelling, lacerations or trismus. No oropharyngeal exudate. Tonsillar erythema, tonsillar exudate.      Neck:  Full range of motion with neck supple and no adenopathy. Thyroid tenderness is present. No tracheal deviation, no edema, no erythema, normal range of motion, no stridor, no crepitus and no neck rigidity present. No thyroid mass present.     Cardiovascular:   Intact distal pulses and normal pulses.      Pulmonary/Chest:   Effort normal and breath sounds normal. No stridor.     Psychiatric:   His speech is normal and behavior is normal. His mood appears not anxious. His affect is not labile.     Neurological:   He is alert and oriented to person, place, and time. No sensory deficit.     Skin:   No abrasions, lacerations, lesions, or rashes. No abrasion and no bruising noted.         Tests / Results:  none    Assessment:       1. Dizziness and giddiness          Plan:       Suspect proprioception issue either related to normal aging or neurologic issue, but the remainder of his neuro exam is unremarkable and no suspicion for neurologic problem at this point.   No inner ear cause suspected.  Vestibular PT  Follow- up 2-3 months  Consider Neurology consult if not improved for eval of peripheral nerves

## 2018-08-31 ENCOUNTER — CLINICAL SUPPORT (OUTPATIENT)
Dept: REHABILITATION | Facility: HOSPITAL | Age: 73
End: 2018-08-31
Attending: OTOLARYNGOLOGY
Payer: MEDICARE

## 2018-08-31 DIAGNOSIS — R26.89 BALANCE PROBLEM: ICD-10-CM

## 2018-08-31 DIAGNOSIS — R29.898 WEAKNESS OF BOTH LOWER EXTREMITIES: ICD-10-CM

## 2018-08-31 PROCEDURE — G8978 MOBILITY CURRENT STATUS: HCPCS | Mod: CJ,PO | Performed by: PHYSICAL THERAPIST

## 2018-08-31 PROCEDURE — 97161 PT EVAL LOW COMPLEX 20 MIN: CPT | Mod: PO | Performed by: PHYSICAL THERAPIST

## 2018-08-31 PROCEDURE — 97110 THERAPEUTIC EXERCISES: CPT | Mod: PO | Performed by: PHYSICAL THERAPIST

## 2018-08-31 PROCEDURE — G8979 MOBILITY GOAL STATUS: HCPCS | Mod: CJ,PO | Performed by: PHYSICAL THERAPIST

## 2018-08-31 NOTE — PLAN OF CARE
[facility]  Physical Therapy Initial Evaluation    Name: Jayro Mattson  Clinic Number: [id number]    Therapy Diagnosis:   Encounter Diagnoses   Name Primary?    Balance problem     Weakness of both lower extremities      Physician: Han Monroy MD    Physician Orders: PT Eval and Treat   Medical Diagnosis from Referral: dizziness and giddiness  Evaluation Date: 8/31/2018  Authorization Period Expiration: 12/31/18  Plan of Care Expiration: 11/30/18  Visit # / Visits authorized: 1/ 20    Time In: 8:00  Time Out: 9:00  Total Billable Time: 60 minutes    Precautions: Standard    Subjective   Date of onset: 4 months ago  History of current condition - Jayro reports: first becoming aware of unsteadiness with standing still 3-4 months ago. He states that he doesn't feel dizzy or light headed. He just feels that he is swaying forward and backwards when he stands still.  He feels steady with walking and moving however. Pt has impaired eyesight and is legally blind since 2008.  He also has some hearing impairment.  He states his eyesight has worsened in last 6 months. He is not diabetic and denies any neuropathy.  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       Past Medical History:   Diagnosis Date    Allergy     BPH (benign prostatic hyperplasia)     Diverticulitis     Diverticulosis 3/17/2006    Seen at colonoscopy.    Elevated PSA     Glaucoma     HEARING LOSS     Hypertension     Kidney stone      Jayro Mattson  has a past surgical history that includes Glaucoma surgery; Eye surgery; Colonoscopy (3/17/2006  [facility]); Colonoscopy (4/16/2012  [facility]); Cystoscopy; BIOPSY, PROSTATE (Bilateral, 4/16/2012); CYSTOSCOPY (N/A, 4/16/2012); and COLONOSCOPY (N/A, 4/16/2012).    Jayro has a current medication list which includes the following prescription(s): bimatoprost, brimonidine 0.2%, cholecalciferol (vitamin d3), coenzyme q10,  dorzolamide-timolol 2-0.5%, durezol, fish oil-omega-3 fatty acids, latanoprost, lutein, magnesium oxide-mg aa chelate, pilocarpine hcl 4%, pilocarpine hcl 2%, tadalafil, tamsulosin, and verapamil.    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    Gait: Pt ambulates without assistive device, but is vision impaired so needs some guidance. Good step through gait.    Strength   Right LE Left LE   Hip flexion 4/5 4/5   Hip extension 4/5 4/5   Hip abduction 4/5 4/5   Hip adduction 4/5 4/5   Knee flexion 4/5 4/5   Knee extension 4/5 4/5   Ankle dorsiflexion 4/5 4/5   Ankle plantarflexion 4/5 4/5   Ankle eversion 4/5 4/5   Ankle inversion 4/5 4/5   Great toe extension 5/5 5/5     Abdominals 4/5 Transversus Abdominus      Sensation: all peripheral nerves and dermatomes intact and equal bilaterally.    Special Tests    Murphy Balance Test  Murphy Balance Scale    1. SITTING TO STANDIN - able to stand without using hands and stabilize independently   2. STANDING UNSUPPORTED: 2 - able to stand 30 seconds unsupported   3. SITTING WITH BACK UNSUPPORTED BUT FEET SUPPORTED ON FLOOR OR ON A STOOL: 4 - able to sit safely and securely for 2 minutes   4. STANDING TO SITTING: 3 - controls descent by using hands   5. TRANSFERS: 4 - able to transfer safely with minor use of hands   6. STANDING UNSUPPORTED WITH EYES CLOSED: 3 - able to stand 10 seconds with supervision   7. STANDING  UNSUPPORTED WITH FEET TOGETHER: 3 - able to place feet together independently and stand 1 minute with supervision   8. REACHING FORWARD WITH OUTSTRETCHED ARM WHILE STANDING: 3 - can reach forward 12 cm (5 inches)   9.  OBJECT FROM THE FLOOR FROM A STANDING POSITION: 3 - able to  slipper but needs supervision   10. TURNING TO LOOK BEHIND OVER LEFT AND RIGHT SHOULDERS WHILE STANDIN - looks behind from both sides and weight shifts well   11. TURN 360 DEGREES: 2 - able to turn 360 degrees safely but slowly   12. PLACE ALTERNATE FOOT ON STEP OR STOOL WHILE STANDING UNSUPPORTED: 3 - able to stand independently and complete 8 steps in > 20 seconds   13. STANDING UNSUPPORTED ONE FOOT IN FRONT: 4 - able to place foot tandem independently and hold 30 seconds   14. STANDING ON ONE LE - able to lift leg independently and hold > 10 seconds     TOTAL SCORE: 46/56    41-56 = low fall risk  21-40 = moderate fall risk  0 -20 = high fall risk    Vestibular occular reflex: No sign of difficulty or disturbance.  [place] Hallpike negative bilaterally.         CMS Impairment/Limitation/Restriction for FOTO neuro Survey    Therapist reviewed FOTO scores for Jayro Mattson on 2018.   FOTO documents entered into [employer] - see Media section.    Limitation Score:   Category: Mobility    Current : CJ = at least 20% but < 40% impaired, limited or restricted  Goal: CJ = at least 20% but < 40% impaired, limited or restricted         TREATMENT   Treatment Time In: 8:30  Treatment Time Out: 9:00  Total Treatment time separate from Evaluation: 30 minutes    Jayro received therapeutic exercises to develop strength, ROM and balance and proprioception for 30 minutes including:  Standing feet together: eyes ope, eyes closed, on foam 30 sec ea  Standing tandem stance: eyes open, eyes closed, on foam 30 sec ea  Standing single leg balance: eyes open, eyes closed, on foam 30 sec ea  March in place: eyes open, eyes closed 30 sec  ea  Heel raises x 20  Toe raises x 20  Recumbent bike 10 min    Home Exercises and Patient Education Provided    Education provided:   - Pt instructed in HEP and precautions around home and work.    Written Home Exercises Provided: yes.  Exercises were reviewed and Jayro was able to demonstrate them prior to the end of the session.  Jayro demonstrated good  understanding of the education provided.     See EMR under Patient Instructions for exercises provided 8/31/2018.    Assessment   Jayro is a 73 y.o. male referred to outpatient Physical Therapy with a medical diagnosis of dizziness. Pt presents with mild balance disturbance which is much more apparent with eyes closed.  He actually can balance well with eyes open, but when vision is removed, he has significant sway.  Suspect that greater loss of vision in last 6 months has contributed to his sensation of unsteadiness with standing in last 3-4 months.    Pt prognosis is Excellent.   Pt will benefit from skilled outpatient Physical Therapy to address the deficits stated above and in the chart below, provide pt/family education, and to maximize pt's level of independence.     Plan of care discussed with patient: Yes  Pt's spiritual, cultural and educational needs considered and patient is agreeable to the plan of care and goals as stated below:     Anticipated Barriers for therapy: transportation needs    Medical Necessity is demonstrated by the following  History  Co-morbidities and personal factors that may impact the plan of care Co-morbidities:   HTN, vision problems    Personal Factors:   no deficits     low   Examination  Body Structures and Functions, activity limitations and participation restrictions that may impact the plan of care Body Regions:   head  neck  lower extremities  trunk    Body Systems:    gross symmetry  ROM  strength  balance  gait  transitions    Participation Restrictions:   none    Activity limitations:   Learning and applying  knowledge  no deficits    General Tasks and Commands  no deficits    Communication  no deficits    Mobility  walking    Self care  no deficits    Domestic Life  no deficits    Interactions/Relationships  no deficits    Life Areas  no deficits    Community and Social Life  no deficits         low   Clinical Presentation stable and uncomplicated low   Decision Making/ Complexity Score: low     Goals:  Short Term Goals: 4 weeks   Pt will have less sway with standing to improve steadiness with standing and gait.  Pt to improve Tinetti score by 3 percentage points.  Pt able to balance single leg with eyes closed for 10 seconds.    Long Term Goals: 8 weeks   Pt able to balance single leg with eyes closed for 20 seconds  5/5 strength bilateral LEs for improved steadiness with standing.  Pt improves Tinetti score by 5 percentage points.    Plan   Plan of care Certification: 8/31/2018 to 11/30/18.    Outpatient Physical Therapy 2 times weekly for 8 weeks to include the following interventions: Gait Training, Neuromuscular Re-ed, Patient Education and Therapeutic Exercise.     Lamar Streeter, PT

## 2018-08-31 NOTE — PATIENT INSTRUCTIONS
Static Balance: Rhomberg position on floor    Standing with your feet together and arms across your chest, practice keeping your balance with your eyes open. Stand next to a wall or stable surface for balance support if needed. 30 seconds    Do the same exercise with your eyes closed to make it harder.          TANDEM STANCE WITH SUPPORT     front of a chair, table or counter top for support. Then place the heel of one foot so that it is touching the toes of the other foot. Maintain your balance in this position.  30 seconds with eyes open, 30 seconds with eyes closed        SINGLE LEG STANCE - SLS    Stand on one leg and maintain your balance. 30 seconds with eyes open, 30 seconds with eyes closed        Marching in Place    March in place slowly, with high knees. 30 seconds with eyes open. 30 seconds with eyes closed.        STANDING HEEL RAISES    While standing, raise up on your toes as you lift your heels off the ground. 20 times        TOES RAISES - DORSIFLEXION STANDING    In a standing position with your feet on the ground, raise up your forefoot and toes as you bend at your ankle.   20 times

## 2018-09-04 NOTE — PROGRESS NOTES
Physical Therapy Daily Treatment Note     Name: Jayro Mattson  [facility] Number: [id number]    Therapy Diagnosis:   Encounter Diagnoses   Name Primary?    Balance problem     Weakness of both lower extremities      Physician: Han Monroy MD    Visit Date: 9/5/2018  Physician Orders: PT Eval and Treat   Medical Diagnosis from Referral: dizziness and giddiness  Evaluation Date: 8/31/2018  Authorization Period Expiration: 12/31/18  Plan of Care Expiration: 11/30/18  Visit # / Visits authorized: 2/ 20    Time In: 0700  Time Out: 0745  Total Billable Time: 40 minutes    Precautions: Standard, legally blind since 2008    Subjective     Pt reports: that he is having a good day so far. He was compliant with home exercise program and states that balancing with his eyes closed is much more challenging. Patient states he often has blind spots in his vision due to glaucoma and he does trip at times.   Response to previous treatment: no adverse effects  Functional change: too soon to determine     Pain: 0/10    Objective     Jayro received therapeutic exercises to develop strength, endurance, ROM, flexibility, posture and core stabilization for 15 minutes including:  Recumbent bike x 10 minutes, level 2, seat 16  Heel raises x 20  Toe raises x 20  Lateral walking (hands hovering along counter top, yellow TB around knees) x 5     Jayro participated in neuromuscular re-education activities to improve: Balance, Coordination, Sense, Proprioception and Posture for 15 minutes. The following activities were included:  Standing feet together: eyes open, eyes closed, on foam 30 sec ea  Standing tandem stance: eyes open on foam, eyes closed on ground 30 sec x 2 ea  Standing single leg balance: eyes open, eyes closed, on foam 30 sec ea  March in place: eyes open, eyes closed 30 sec ea    Jayro participated in gait training activities to improve functional mobility and safety x 10 minutes  Stepping up and over full foam roll x20  ea (forward and lateral steps)  Forward, retro and lateral ambulation x 25 feet x 2     Home Exercises Provided and Patient Education Provided     Education provided:   - Pt instructed in HEP and precautions around home and work.    Written Home Exercises Provided: Patient instructed to cont prior HEP.  Exercises were reviewed and Jayro was able to demonstrate them prior to the end of the session.  Jayro demonstrated good  understanding of the education provided.     See EMR under Patient Instructions for exercises provided 08/31/18.    Assessment     Jayro with excellent tolerance to treatment today. Patient demonstrates decreased stability in both tandem and single limb stance positions with added challenge noted with eyes closed and on air ex pad. Patient able to participate in gait training activities today with no loss of balance but cues needed to increase L step height and length. No complaints of pain or discomfort noted during treatment session today.   Jayro is progressing well towards his goals.   Pt prognosis is Excellent.     Pt will continue to benefit from skilled outpatient physical therapy to address the deficits listed in the problem list box on initial evaluation, provide pt/family education and to maximize pt's level of independence in the home and community environment.     Pt's spiritual, cultural and educational needs considered and pt agreeable to plan of care and goals.    Anticipated barriers to physical therapy: transportation needs    Goals:   Short Term Goals: 4 weeks   Pt will have less sway with standing to improve steadiness with standing and gait.  Pt to improve Tinetti score by 3 percentage points.  Pt able to balance single leg with eyes closed for 10 seconds.     Long Term Goals: 8 weeks   Pt able to balance single leg with eyes closed for 20 seconds  5/5 strength bilateral LEs for improved steadiness with standing.  Pt improves Tinetti score by 5 percentage  points.    Plan     Plan of care Certification: 8/31/2018 to 11/30/18.     Outpatient Physical Therapy 2 times weekly for 8 weeks to include the following interventions: Gait Training, Neuromuscular Re-ed, Patient Education and Therapeutic Exercise.    Freda Galloway, PTA

## 2018-09-05 ENCOUNTER — CLINICAL SUPPORT (OUTPATIENT)
Dept: REHABILITATION | Facility: HOSPITAL | Age: 73
End: 2018-09-05
Attending: OTOLARYNGOLOGY
Payer: MEDICARE

## 2018-09-05 DIAGNOSIS — R29.898 WEAKNESS OF BOTH LOWER EXTREMITIES: ICD-10-CM

## 2018-09-05 DIAGNOSIS — R26.89 BALANCE PROBLEM: ICD-10-CM

## 2018-09-05 PROCEDURE — 97112 NEUROMUSCULAR REEDUCATION: CPT | Mod: PO

## 2018-09-05 PROCEDURE — 97110 THERAPEUTIC EXERCISES: CPT | Mod: PO

## 2018-09-05 PROCEDURE — 97116 GAIT TRAINING THERAPY: CPT | Mod: PO

## 2018-09-10 ENCOUNTER — CLINICAL SUPPORT (OUTPATIENT)
Dept: REHABILITATION | Facility: HOSPITAL | Age: 73
End: 2018-09-10
Attending: OTOLARYNGOLOGY
Payer: MEDICARE

## 2018-09-10 DIAGNOSIS — R26.89 BALANCE PROBLEM: ICD-10-CM

## 2018-09-10 DIAGNOSIS — R29.898 WEAKNESS OF BOTH LOWER EXTREMITIES: ICD-10-CM

## 2018-09-10 PROCEDURE — 97112 NEUROMUSCULAR REEDUCATION: CPT | Mod: PO | Performed by: PHYSICAL THERAPIST

## 2018-09-10 PROCEDURE — 97110 THERAPEUTIC EXERCISES: CPT | Mod: PO | Performed by: PHYSICAL THERAPIST

## 2018-09-10 PROCEDURE — 97116 GAIT TRAINING THERAPY: CPT | Mod: PO | Performed by: PHYSICAL THERAPIST

## 2018-09-10 NOTE — PROGRESS NOTES
Physical Therapy Daily Treatment Note     Name: Jayro Mattson  Clinic Number: [id number]    Therapy Diagnosis:   Encounter Diagnoses   Name Primary?    Balance problem     Weakness of both lower extremities      Physician: Han Monroy MD    Visit Date: 9/10/2018  Physician Orders: PT Eval and Treat   Medical Diagnosis from Referral: dizziness and giddiness  Evaluation Date: 8/31/2018  Authorization Period Expiration: 12/31/18  Plan of Care Expiration: 11/30/18  Visit # / Visits authorized: 3/ 20    Time In: 0755  Time Out: 0835  Total Billable Time: 40 minutes    Precautions: Standard, legally blind since 2008    Subjective     Pt reports: Doing exercises fairly regularly at home. Has difficulty with eyes closed activities.  . He was compliant with home exercise program.  Response to previous treatment: no adverse effects  Functional change: Steadier with eyes closed with feet together, not needing HHA today.     Pain: 0/10    Objective     Jayro received therapeutic exercises to develop strength, endurance, ROM, flexibility, posture and core stabilization for 15 minutes including:  Recumbent bike x 10 minutes, level 2, seat 16  Heel raises x 20  Toe raises x 20  Lateral walking (hands hovering along counter top, yellow TB around knees) x 5     Jayro participated in neuromuscular re-education activities to improve: Balance, Coordination, Sense, Proprioception and Posture for 15 minutes. The following activities were included:  Standing feet together: eyes open, eyes closed, on foam 30 sec ea  Standing tandem stance: eyes open on foam, eyes closed on ground 30 sec x 2 ea  Standing single leg balance: eyes open, eyes closed, on foam 30 sec ea  March in place: eyes open, eyes closed 30 sec ea  Reaching and stepping multiple directions with orange ball.    Jayro participated in gait training activities to improve functional mobility and safety x 10 minutes  Home Exercises Provided and Patient Education  Provided   Stepping up and over full foam roll x20 ea (forward and lateral steps)NP  Forward, retro and lateral ambulation x 25 feet x 2, eyes open and eyes closed.      Education provided:   - Pt instructed in HEP and precautions around home and work.    Written Home Exercises Provided: Patient instructed to cont prior HEP.  Exercises were reviewed and Jayro was able to demonstrate them prior to the end of the session.  Jayro demonstrated good  understanding of the education provided.     See EMR under Patient Instructions for exercises provided 08/31/18.    Assessment     Discussed precautions for lighting when walking in dark or on uneven surface.  Wears boots often at work.  Suggested tennis shoes when able for better foot muscle control. Improving with eyes closed with feet together and on foam surface.  Jayro is progressing well towards his goals.   Pt prognosis is Excellent.     Pt will continue to benefit from skilled outpatient physical therapy to address the deficits listed in the problem list box on initial evaluation, provide pt/family education and to maximize pt's level of independence in the home and community environment.     Pt's spiritual, cultural and educational needs considered and pt agreeable to plan of care and goals.    Anticipated barriers to physical therapy: transportation needs    Goals:   Short Term Goals: 4 weeks   Pt will have less sway with standing to improve steadiness with standing and gait. PROGRESSING  Pt to improve Tinetti score by 3 percentage points. NOT TESTED  Pt able to balance single leg with eyes closed for 10 seconds. PROGRESSING     Long Term Goals: 8 weeks   Pt able to balance single leg with eyes closed for 20 seconds PROGRESSING  5/5 strength bilateral LEs for improved steadiness with standing. PROGRESSING  Pt improves Tinetti score by 5 percentage points. NOT TESTED    Plan     Plan of care Certification: 8/31/2018 to 11/30/18.   Cont per POC  Outpatient  Physical Therapy 2 times weekly for 8 weeks to include the following interventions: Gait Training, Neuromuscular Re-ed, Patient Education and Therapeutic Exercise.    Lamar Streeter, PT

## 2018-09-13 ENCOUNTER — CLINICAL SUPPORT (OUTPATIENT)
Dept: REHABILITATION | Facility: HOSPITAL | Age: 73
End: 2018-09-13
Attending: EMERGENCY MEDICINE
Payer: MEDICARE

## 2018-09-13 DIAGNOSIS — R29.898 WEAKNESS OF BOTH LOWER EXTREMITIES: ICD-10-CM

## 2018-09-13 DIAGNOSIS — R26.89 BALANCE PROBLEM: ICD-10-CM

## 2018-09-13 PROCEDURE — 97112 NEUROMUSCULAR REEDUCATION: CPT | Mod: PO | Performed by: PHYSICAL THERAPIST

## 2018-09-13 PROCEDURE — 97110 THERAPEUTIC EXERCISES: CPT | Mod: PO | Performed by: PHYSICAL THERAPIST

## 2018-09-13 PROCEDURE — 97116 GAIT TRAINING THERAPY: CPT | Mod: PO | Performed by: PHYSICAL THERAPIST

## 2018-09-13 NOTE — PROGRESS NOTES
Physical Therapy Daily Treatment Note     Name: Jayro Mattson  [facility] Number: [id number]    Therapy Diagnosis:   Encounter Diagnoses   Name Primary?    Balance problem     Weakness of both lower extremities      Physician: Han Monroy MD    Visit Date: 9/13/2018  Physician Orders: PT Eval and Treat   Medical Diagnosis from Referral: dizziness and giddiness  Evaluation Date: 8/31/2018  Authorization Period Expiration: 12/31/18  Plan of Care Expiration: 11/30/18  Visit # / Visits authorized: 4/ 20    Time In: 8:00  Time Out: 0845  Total Billable Time: 45 minutes    Precautions: Standard, legally blind since 2008    Subjective     Pt reports: Aware of decreased sway when standing still.  . He was compliant with home exercise program.  Response to previous treatment: no adverse effects  Functional change: Able to single leg balance with eyes open 30 sec without HHA.  Pain: 0/10    Objective     Jayro received therapeutic exercises to develop strength, endurance, ROM, flexibility, posture and core stabilization for 15 minutes including:  Recumbent bike x 10 minutes, level 2, seat 16  Heel raises x 20  Toe raises x 20  Lateral walking (hands hovering along counter top, yellow TB around knees) x 5     Jayro participated in neuromuscular re-education activities to improve: Balance, Coordination, Sense, Proprioception and Posture for 15 minutes. The following activities were included:  Standing feet together: eyes open, eyes closed, on foam 30 sec ea  Standing tandem stance: eyes open on foam, eyes closed on ground 30 sec x 2 ea  Standing single leg balance: eyes open, eyes closed, on foam 30 sec ea  March in place: eyes open, eyes closed 30 sec ea  Reaching and stepping multiple directions with orange ball.    Jayro participated in gait training activities to improve functional mobility and safety x 10 minutes  Home Exercises Provided and Patient Education Provided   Stepping up and over full foam roll x20 ea  (forward and lateral steps)NP  Forward, retro and lateral ambulation x 25 feet x 4, eyes open and eyes closed.  Heel to toe walking 25 ft x 4      Education provided:   - Pt instructed in HEP and precautions around home and work.    Written Home Exercises Provided: Patient instructed to cont prior HEP.  Exercises were reviewed and Jayro was able to demonstrate them prior to the end of the session.  Jayro demonstrated good  understanding of the education provided.     See EMR under Patient Instructions for exercises provided 08/31/18.    Assessment     Pt is demonstrating much improved steadiness with eyes open, closed and on foam with eyes open.  Working on change of direction and forward, overhead and lateral reaching with stepping needed to adjust balance for change of center of gravity.  Jayro is progressing well towards his goals.   Pt prognosis is Excellent.     Pt will continue to benefit from skilled outpatient physical therapy to address the deficits listed in the problem list box on initial evaluation, provide pt/family education and to maximize pt's level of independence in the home and community environment.     Pt's spiritual, cultural and educational needs considered and pt agreeable to plan of care and goals.    Anticipated barriers to physical therapy: transportation needs    Goals:   Short Term Goals: 4 weeks   Pt will have less sway with standing to improve steadiness with standing and gait. MET  Pt to improve Tinetti score by 3 percentage points. NOT TESTED  Pt able to balance single leg with eyes closed for 10 seconds. MET   Long Term Goals: 8 weeks   Pt able to balance single leg with eyes closed for 20 seconds PROGRESSING  5/5 strength bilateral LEs for improved steadiness with standing. PROGRESSING  Pt improves Tinetti score by 5 percentage points. NOT TESTED    Plan     Plan of care Certification: 8/31/2018 to 11/30/18.   Cont per POC with focus on balance and strength.  Outpatient  Physical Therapy 2 times weekly for 8 weeks to include the following interventions: Gait Training, Neuromuscular Re-ed, Patient Education and Therapeutic Exercise.    Lamar Streeter, PT

## 2018-09-18 ENCOUNTER — CLINICAL SUPPORT (OUTPATIENT)
Dept: REHABILITATION | Facility: HOSPITAL | Age: 73
End: 2018-09-18
Attending: OTOLARYNGOLOGY
Payer: MEDICARE

## 2018-09-18 DIAGNOSIS — R29.898 WEAKNESS OF BOTH LOWER EXTREMITIES: ICD-10-CM

## 2018-09-18 DIAGNOSIS — R26.89 BALANCE PROBLEM: ICD-10-CM

## 2018-09-18 PROCEDURE — 97110 THERAPEUTIC EXERCISES: CPT | Mod: PO

## 2018-09-18 PROCEDURE — 97112 NEUROMUSCULAR REEDUCATION: CPT | Mod: PO

## 2018-09-18 NOTE — PROGRESS NOTES
Physical Therapy Daily Treatment Note     Name: Jayro Mattson  Clinic Number: [id number]    Therapy Diagnosis:   Encounter Diagnoses   Name Primary?    Balance problem     Weakness of both lower extremities      Physician: Han Monroy MD    Visit Date: 9/18/2018  Physician Orders: PT Eval and Treat   Medical Diagnosis from Referral: dizziness and giddiness  Evaluation Date: 8/31/2018  Authorization Period Expiration: 12/31/18  Plan of Care Expiration: 11/30/18  Visit # / Visits authorized: 5/ 20    Time In: 0950  Time Out: 1040  Total Billable Time: 45 minutes    Precautions: Standard, legally blind since 2008    Subjective     Pt reports: that he is more confident in his ability to balance. Patient states he still sways at times but he feels like this has gotten much better. Patient states that he feels like it is easier to lose his balance to the side as opposed to forward/backward. He was compliant with home exercise program.  Response to previous treatment: no adverse effects  Functional change: Able to single leg balance with eyes open 30 sec without HHA.    Pain: 0/10    Objective     Jayro received therapeutic exercises to develop strength, endurance, ROM, flexibility, posture and core stabilization for 15 minutes including:  Recumbent bike x 10 minutes, level 2, seat 16  Heel raises x 20  Toe raises x 20  Step ups (6 inch) 2x10 ea   Lateral walking (hands hovering along counter top, yellow TB around knees) x 5     Jayro participated in neuromuscular re-education activities to improve: Balance, Coordination, Sense, Proprioception and Posture for 25 minutes. The following activities were included:  Standing feet together: eyes open, eyes closed, on foam 30 sec ea  Standing tandem stance: eyes open on foam, eyes closed on ground 30 sec x 2 ea  Standing single leg balance: eyes open, eyes closed, on foam 30 sec ea  March in place: eyes open, eyes closed 30 sec ea (on foam)  Reaching and stepping  multiple directions with orange ball.  Tandem stance orange band pulls (KATHRYN) x 4 directions x 15 ea     Jayro participated in gait training activities to improve functional mobility and safety x 10 minutes  Home Exercises Provided and Patient Education Provided   Stepping up and over full foam roll x20 ea (forward and lateral steps)NP  Forward, retro and lateral ambulation x 25 feet x 4, eyes open and eyes closed.  Heel to toe walking 25 ft x 4    Education provided:   - Pt instructed in HEP and precautions around home and work.    Written Home Exercises Provided: Patient instructed to cont prior HEP. Issued Red TB today so patient can perform lateral walking along kitchen counter top at home.  Exercises were reviewed and Jayro was able to demonstrate them prior to the end of the session.  Jayro demonstrated good  understanding of the education provided.     See EMR under Patient Instructions for exercises provided 08/31/18.    Assessment   Jayro with excellent tolerance to treatment today. Patient able to perform forward step ups today without UE assist or loss of balance indicating improving single limb stability. Patient moderately challenged with KATHRYN activity in tandem stance position with lateral postural sway and side step needed to recover loss of balance. Patient remains highly motivated to improve condition.     Jayro is progressing well towards his goals.   Pt prognosis is Excellent.     Pt will continue to benefit from skilled outpatient physical therapy to address the deficits listed in the problem list box on initial evaluation, provide pt/family education and to maximize pt's level of independence in the home and community environment.     Pt's spiritual, cultural and educational needs considered and pt agreeable to plan of care and goals.    Anticipated barriers to physical therapy: transportation needs    Goals:   Short Term Goals: 4 weeks   Pt will have less sway with standing to improve  steadiness with standing and gait. MET  Pt to improve Tinetti score by 3 percentage points. NOT TESTED  Pt able to balance single leg with eyes closed for 10 seconds. MET   Long Term Goals: 8 weeks   Pt able to balance single leg with eyes closed for 20 seconds PROGRESSING  5/5 strength bilateral LEs for improved steadiness with standing. PROGRESSING  Pt improves Tinetti score by 5 percentage points. NOT TESTED    Plan     Plan of care Certification: 8/31/2018 to 11/30/18.   Cont per POC with focus on balance and strength.  Outpatient Physical Therapy 2 times weekly for 8 weeks to include the following interventions: Gait Training, Neuromuscular Re-ed, Patient Education and Therapeutic Exercise.    Freda Galloway, PTA

## 2018-09-20 ENCOUNTER — CLINICAL SUPPORT (OUTPATIENT)
Dept: REHABILITATION | Facility: HOSPITAL | Age: 73
End: 2018-09-20
Attending: EMERGENCY MEDICINE
Payer: MEDICARE

## 2018-09-20 DIAGNOSIS — R29.898 WEAKNESS OF BOTH LOWER EXTREMITIES: ICD-10-CM

## 2018-09-20 DIAGNOSIS — R26.89 BALANCE PROBLEM: ICD-10-CM

## 2018-09-20 PROCEDURE — 97110 THERAPEUTIC EXERCISES: CPT | Mod: PO | Performed by: PHYSICAL THERAPIST

## 2018-09-20 PROCEDURE — 97112 NEUROMUSCULAR REEDUCATION: CPT | Mod: PO | Performed by: PHYSICAL THERAPIST

## 2018-09-20 PROCEDURE — 97116 GAIT TRAINING THERAPY: CPT | Mod: PO | Performed by: PHYSICAL THERAPIST

## 2018-09-20 NOTE — PROGRESS NOTES
Physical Therapy Daily Treatment Note     Name: Jayro Mattson  Clinic Number: [id number]    Therapy Diagnosis:   Encounter Diagnoses   Name Primary?    Balance problem     Weakness of both lower extremities      Physician: Han Monroy MD    Visit Date: 9/20/2018  Physician Orders: PT Eval and Treat   Medical Diagnosis from Referral: dizziness and giddiness  Evaluation Date: 8/31/2018  Authorization Period Expiration: 12/31/18  Plan of Care Expiration: 11/30/18  Visit # / Visits authorized: 6/ 20    Time In: 7:55  Time Out: 8:40  Total Billable Time: 45 minutes    Precautions: Standard, legally blind since 2008    Subjective     Pt reports: feeling less sway when standing still.  Balance is better.  Still aware of difficulty with eyes closed, but steadier.   He was compliant with home exercise program.  Response to previous treatment: no adverse effects  Functional change: Able to single leg balance with eyes open 30 sec without HHA. Feet together and tandem stance with eyes open  And with eyes closed without HHA.    Pain: 0/10    Objective     Jayro received therapeutic exercises to develop strength, endurance, ROM, flexibility, posture and core stabilization for 15 minutes including:  Recumbent bike x 10 minutes, level 2, seat 16  Heel raises x 20  Toe raises x 20  Step ups (6 inch) 2x10 ea   Lateral walking (hands hovering along counter top, yellow TB around knees) x 5     Jayro participated in neuromuscular re-education activities to improve: Balance, Coordination, Sense, Proprioception and Posture for 25 minutes. The following activities were included:  Standing feet together: eyes open, eyes closed, on foam 30 sec ea  Standing tandem stance: eyes open on foam, eyes closed on ground 30 sec x 2 ea  Standing single leg balance: eyes open, eyes closed, on foam 30 sec ea  March in place: eyes open, eyes closed 30 sec ea (on foam)  Reaching and stepping multiple directions with orange ball.  Tandem stance  orange band pulls (KATHRYN) x 4 directions x 15 ea     Jayro participated in gait training activities to improve functional mobility and safety x 10 minutes  Home Exercises Provided and Patient Education Provided   Stepping up and over full foam roll x20 ea (forward and lateral steps)NP  Forward, retro and lateral ambulation x 25 feet x 4, eyes open and eyes closed.  Heel to toe walking 25 ft x 4    Education provided:   - Pt instructed in HEP and precautions around home and work.    Written Home Exercises Provided: Patient instructed to cont prior HEP.   Exercises were reviewed and Jayro was able to demonstrate them prior to the end of the session.  Jayro demonstrated good  understanding of the education provided.     See EMR under Patient Instructions for exercises provided 08/31/18.    Assessment     Jayro is progressing well towards his goals.     He demonstrates much improved stability with balance activities and dynamic stepping drills.  Difficulty with heel to toe walking with eyes closed, but steady with eyes open.. Eyes closed important because of pt's status of legally blind, progressive disorder.  Pt prognosis is Excellent.     Pt will continue to benefit from skilled outpatient physical therapy to address the deficits listed in the problem list box on initial evaluation, provide pt/family education and to maximize pt's level of independence in the home and community environment.     Pt's spiritual, cultural and educational needs considered and pt agreeable to plan of care and goals.    Anticipated barriers to physical therapy: transportation needs    Goals:   Short Term Goals: 4 weeks   Pt will have less sway with standing to improve steadiness with standing and gait. MET  Pt to improve Tinetti score by 3 percentage points. NOT TESTED  Pt able to balance single leg with eyes closed for 10 seconds. MET   Long Term Goals: 8 weeks   Pt able to balance single leg with eyes closed for 20 seconds MET  5/5  strength bilateral LEs for improved steadiness with standing. PROGRESSING  Pt improves Tinetti score by 5 percentage points. NOT TESTED    Plan     Plan of care Certification: 8/31/2018 to 11/30/18.   Cont per POC with focus on balance and strength.  Outpatient Physical Therapy 2 times weekly for 8 weeks to include the following interventions: Gait Training, Neuromuscular Re-ed, Patient Education and Therapeutic Exercise.    Lamar Streeter, PT

## 2018-09-24 ENCOUNTER — CLINICAL SUPPORT (OUTPATIENT)
Dept: REHABILITATION | Facility: HOSPITAL | Age: 73
End: 2018-09-24
Attending: EMERGENCY MEDICINE
Payer: MEDICARE

## 2018-09-24 DIAGNOSIS — R26.89 BALANCE PROBLEM: ICD-10-CM

## 2018-09-24 DIAGNOSIS — R29.898 WEAKNESS OF BOTH LOWER EXTREMITIES: ICD-10-CM

## 2018-09-24 PROCEDURE — 97110 THERAPEUTIC EXERCISES: CPT | Mod: PO

## 2018-09-24 PROCEDURE — 97112 NEUROMUSCULAR REEDUCATION: CPT | Mod: PO

## 2018-09-24 NOTE — PROGRESS NOTES
Physical Therapy Daily Treatment Note     Name: Jayro Mattson  [facility] Number: [id number]    Therapy Diagnosis:   Encounter Diagnoses   Name Primary?    Balance problem     Weakness of both lower extremities      Physician: Han Monroy MD    Visit Date: 9/24/2018  Physician Orders: PT Eval and Treat   Medical Diagnosis from Referral: dizziness and giddiness  Evaluation Date: 8/31/2018  Authorization Period Expiration: 12/31/18  Plan of Care Expiration: 11/30/18  Visit # / Visits authorized: 7/ 20    Time In: 0800  Time Out: 0845  Total Billable Time: 25 minutes    Precautions: Standard, legally blind since 2008    Subjective     Pt reports: that he is feeling great today. Patient states he is really enjoying the therapy sessions and he does fee like his balance is really improving. He was compliant with home exercise program.  Response to previous treatment: no adverse effects  Functional change: Able to single leg balance with eyes open 30 sec without HHA. Feet together and tandem stance with eyes open  And with eyes closed without HHA.    Pain: 0/10    Objective     Jayro received therapeutic exercises to develop strength, endurance, ROM, flexibility, posture and core stabilization for 15 minutes including:  Recumbent bike x 10 minutes, level 2, seat 16  Heel raises x 20  Toe raises x 20  Step ups (6 inch) 2x10 ea   Lateral walking (hands hovering along counter top, yellow TB around knees) x 5     Jayro participated in neuromuscular re-education activities to improve: Balance, Coordination, Sense, Proprioception and Posture for 25 minutes. The following activities were included:    Standing feet together: eyes open, eyes closed, on foam 30 sec ea  Standing tandem stance: eyes open on foam, eyes closed on ground 30 sec x 2 ea  Standing single leg balance: eyes open, eyes closed, on foam 30 sec ea  March in place: eyes open, eyes closed 30 sec ea (on foam)  Reaching and stepping multiple directions with  orange ball.  Tandem stance orange band pulls (KATHRYN) x 4 directions x 15 ea     Jayro participated in gait training activities to improve functional mobility and safety x 10 minutes    Stepping up and over full foam roll x20 ea (forward and lateral steps)NP  Forward, retro and lateral ambulation x 25 feet x 4, eyes open and eyes closed.  Heel to toe walking 25 ft x 4    Home Exercises Provided and Patient Education Provided   Education provided:   - Pt instructed in HEP and precautions around home and work.    Written Home Exercises Provided: Patient instructed to cont prior HEP.   Exercises were reviewed and Jayro was able to demonstrate them prior to the end of the session.  Jayro demonstrated good  understanding of the education provided.     See EMR under Patient Instructions for exercises provided 08/31/18.    Assessment     Jayro is progressing well towards his goals.     Jayro with lateral loss of balance episodes when performing KATHRYN activity requiring step strategy to recover stability. Will continue to focus on static and dynamic eyes closed activities due to patient's progressive blindness. Patient very willing to exercise and remains highly motivated to improve his condition.     Pt prognosis is Excellent.     Pt will continue to benefit from skilled outpatient physical therapy to address the deficits listed in the problem list box on initial evaluation, provide pt/family education and to maximize pt's level of independence in the home and community environment.     Pt's spiritual, cultural and educational needs considered and pt agreeable to plan of care and goals.    Anticipated barriers to physical therapy: transportation needs    Goals:   Short Term Goals: 4 weeks   Pt will have less sway with standing to improve steadiness with standing and gait. MET  Pt to improve Tinetti score by 3 percentage points. NOT TESTED  Pt able to balance single leg with eyes closed for 10 seconds. MET   Long  Term Goals: 8 weeks   Pt able to balance single leg with eyes closed for 20 seconds MET  5/5 strength bilateral LEs for improved steadiness with standing. PROGRESSING  Pt improves Tinetti score by 5 percentage points. NOT TESTED    Plan     Plan of care Certification: 8/31/2018 to 11/30/18.   Cont per POC with focus on balance and strength.  Outpatient Physical Therapy 2 times weekly for 8 weeks to include the following interventions: Gait Training, Neuromuscular Re-ed, Patient Education and Therapeutic Exercise.    Freda Galloway, PTA

## 2018-09-27 ENCOUNTER — CLINICAL SUPPORT (OUTPATIENT)
Dept: REHABILITATION | Facility: HOSPITAL | Age: 73
End: 2018-09-27
Attending: OTOLARYNGOLOGY
Payer: MEDICARE

## 2018-09-27 DIAGNOSIS — R29.898 WEAKNESS OF BOTH LOWER EXTREMITIES: ICD-10-CM

## 2018-09-27 DIAGNOSIS — R26.89 BALANCE PROBLEM: ICD-10-CM

## 2018-09-27 PROCEDURE — G8980 MOBILITY D/C STATUS: HCPCS | Mod: CJ,PO | Performed by: PHYSICAL THERAPIST

## 2018-09-27 PROCEDURE — 97112 NEUROMUSCULAR REEDUCATION: CPT | Mod: PO | Performed by: PHYSICAL THERAPIST

## 2018-09-27 PROCEDURE — 97110 THERAPEUTIC EXERCISES: CPT | Mod: PO | Performed by: PHYSICAL THERAPIST

## 2018-09-27 PROCEDURE — 97116 GAIT TRAINING THERAPY: CPT | Mod: PO | Performed by: PHYSICAL THERAPIST

## 2018-09-27 PROCEDURE — G8979 MOBILITY GOAL STATUS: HCPCS | Mod: CJ,PO | Performed by: PHYSICAL THERAPIST

## 2018-09-27 NOTE — PROGRESS NOTES
Physical Therapy Daily Treatment Note     Name: Jayro Mattson  [facility] Number: [id number]    Therapy Diagnosis:   Encounter Diagnoses   Name Primary?    Balance problem     Weakness of both lower extremities      Physician: Han Monroy MD    Visit Date: 2018  Physician Orders: PT Eval and Treat   Medical Diagnosis from Referral: dizziness and giddiness  Evaluation Date: 2018  Authorization Period Expiration: 18  Plan of Care Expiration: 18  Visit # / Visits authorized:     Time In: 0755  Time Out: 0845  Total Billable Time: 50 minutes    Precautions: Standard, legally blind since     Subjective     Pt reports: He has been doing some of the exercises at home.  He feels like his balance is much improved.  Response to previous treatment: no adverse effects  Functional change: Able to single leg balance with eyes open 30 sec without HHA. Feet together and tandem stance with eyes open  And with eyes closed without HHA.    Pain: 0/10    Objective       Murphy Balance Scale    1. SITTING TO STANDIN - able to stand without using hands and stabilize independently   2. STANDING UNSUPPORTED: 4 - able to stand safely for 2 minutes   3. SITTING WITH BACK UNSUPPORTED BUT FEET SUPPORTED ON FLOOR OR ON A STOOL: 4 - able to sit safely and securely for 2 minutes   4. STANDING TO SITTIN - sits safely with minimal use of hands   5. TRANSFERS: 4 - able to transfer safely with minor use of hands   6. STANDING UNSUPPORTED WITH EYES CLOSED: 4 - able to stand 10 seconds safely   7. STANDING UNSUPPORTED WITH FEET TOGETHER: 4 - able to place feet together independently and stand 1 minute safely   8. REACHING FORWARD WITH OUTSTRETCHED ARM WHILE STANDIN - can reach forward confidently 25 cm (10 inches)   9.  OBJECT FROM THE FLOOR FROM A STANDING POSITION: 4 - able to  slipper safely and easily   10. TURNING TO LOOK BEHIND OVER LEFT AND RIGHT SHOULDERS WHILE STANDIN - looks behind  from both sides and weight shifts well   11. TURN 360 DEGREES: 4 - able to turn 360 degrees safely in 4 seconds or less   12. PLACE ALTERNATE FOOT ON STEP OR STOOL WHILE STANDING UNSUPPORTED: 4 - able to stand independently and safely and complete 8 steps in 20 seconds   13. STANDING UNSUPPORTED ONE FOOT IN FRONT: 4 - able to place foot tandem independently and hold 30 seconds   14. STANDING ON ONE LE - able to lift leg independently and hold > 10 seconds     TOTAL SCORE: 56/56    41-56 = low fall risk  21-40 = moderate fall risk  0 -20 = high fall risk    Jayro received therapeutic exercises to develop strength, endurance, ROM, flexibility, posture and core stabilization for 15 minutes including:  Recumbent bike x 10 minutes, level 2, seat 16  Heel raises x 20  Toe raises x 20  Step ups (6 inch) 2x10 ea   Lateral walking (hands hovering along counter top, yellow TB around knees) x 5     Jayro participated in neuromuscular re-education activities to improve: Balance, Coordination, Sense, Proprioception and Posture for 25 minutes. The following activities were included:    Standing feet together: eyes open, eyes closed, on foam 30 sec ea  Standing tandem stance: eyes open on foam, eyes closed on ground 30 sec x 2 ea  Standing single leg balance: eyes open, eyes closed, on foam 30 sec ea  March in place: eyes open, eyes closed 30 sec ea (on foam)  Reaching and stepping multiple directions with orange ball.  Tandem stance orange band pulls (KATHRYN) x 4 directions x 15 ea     Jayro participated in gait training activities to improve functional mobility and safety x 10 minutes    Stepping up and over full foam roll x20 ea (forward and lateral steps)NP  Forward, retro and lateral ambulation x 25 feet x 4, eyes open and eyes closed.  Heel to toe walking 25 ft x 4    Home Exercises Provided and Patient Education Provided   Education provided:   - Pt instructed in HEP and precautions around home and work.    Written  Home Exercises Provided: Patient instructed to cont prior HEP.   Exercises were reviewed and Jayro was able to demonstrate them prior to the end of the session.  Jayro demonstrated good  understanding of the education provided.     See EMR under Patient Instructions for exercises provided 08/31/18.    Assessment     Jayro is progressing well towards his goals.     Pt has improved in all areas of balance with single leg, gait activities and eyes closed activities. Pt is progressively losing sight.  Encouraged continued HEP to challenge balance as sight diminishes..     Pt prognosis is Excellent.     Pt will continue to benefit from skilled outpatient physical therapy to address the deficits listed in the problem list box on initial evaluation, provide pt/family education and to maximize pt's level of independence in the home and community environment.     Pt's spiritual, cultural and educational needs considered and pt agreeable to plan of care and goals.    Anticipated barriers to physical therapy: transportation needs    Goals:   Short Term Goals: 4 weeks   Pt will have less sway with standing to improve steadiness with standing and gait. MET  Pt to improve Murphy Balance score by 3 percentage points. MET  Pt able to balance single leg with eyes closed for 10 seconds. MET   Long Term Goals: 8 weeks   Pt able to balance single leg with eyes closed for 20 seconds MET  5/5 strength bilateral LEs for improved steadiness with standing. MET  Pt improves Murphy balance score by 5 percentage points.MET    Plan     Plan of care Certification: 8/31/2018 to 11/30/18.   Pt has met goals.  Plan to discharge from PT and continue with HEP.  Lamar Streeter, PT

## 2018-10-02 ENCOUNTER — DOCUMENTATION ONLY (OUTPATIENT)
Dept: REHABILITATION | Facility: HOSPITAL | Age: 73
End: 2018-10-02

## 2018-10-02 DIAGNOSIS — R29.898 WEAKNESS OF BOTH LOWER EXTREMITIES: ICD-10-CM

## 2018-10-02 DIAGNOSIS — R26.89 BALANCE PROBLEM: ICD-10-CM

## 2018-11-15 NOTE — PROGRESS NOTES
Jayro Mattson was seen 8/15/18 for a BPPV evaluation    Patient reported imbalance while standing still or walking for the past several months.  He has had increased difficulty maintaining his balance, especially when he cannot see well due to dim lighting or when his eyes are closed. He denied any recent head trauma and does not have diabetes.  He has bilateral SNHL. He has glaucoma.     VAST was negative right and negative left   Spontaneous nystagmus revealed 2 d/s RB nystagmus.  Head Right Positional was negative  Head Left Positional was negative  Hallpike Right was negative for BPPV but revealed 4 d/s RB nystagmus (<6 d/s is clinically insignificant)   Hallpike Left was negative    Impression: Negative for BPPV bilaterally    Recommend ENT consult to determine if additional vestibular testing is needed.

## 2018-11-20 ENCOUNTER — IMMUNIZATION (OUTPATIENT)
Dept: PHARMACY | Facility: CLINIC | Age: 73
End: 2018-11-20
Payer: MEDICARE

## 2018-11-20 ENCOUNTER — OFFICE VISIT (OUTPATIENT)
Dept: OTOLARYNGOLOGY | Facility: CLINIC | Age: 73
End: 2018-11-20
Payer: MEDICARE

## 2018-11-20 VITALS — HEIGHT: 71 IN | BODY MASS INDEX: 28.02 KG/M2 | WEIGHT: 200.19 LBS

## 2018-11-20 DIAGNOSIS — R42 DIZZINESS AND GIDDINESS: Primary | ICD-10-CM

## 2018-11-20 DIAGNOSIS — J31.0 RHINITIS, UNSPECIFIED TYPE: ICD-10-CM

## 2018-11-20 PROCEDURE — 99999 PR PBB SHADOW E&M-EST. PATIENT-LVL III: CPT | Mod: PBBFAC,HCWC,, | Performed by: OTOLARYNGOLOGY

## 2018-11-20 PROCEDURE — 1101F PT FALLS ASSESS-DOCD LE1/YR: CPT | Mod: CPTII,HCWC,S$GLB, | Performed by: OTOLARYNGOLOGY

## 2018-11-20 PROCEDURE — 99213 OFFICE O/P EST LOW 20 MIN: CPT | Mod: HCWC,S$GLB,, | Performed by: OTOLARYNGOLOGY

## 2018-11-20 RX ORDER — FLUTICASONE PROPIONATE 50 MCG
2 SPRAY, SUSPENSION (ML) NASAL DAILY
Qty: 1 BOTTLE | Refills: 12 | Status: SHIPPED | OUTPATIENT
Start: 2018-11-20 | End: 2020-08-26

## 2018-11-20 NOTE — PROGRESS NOTES
Subjective:       Patient ID: Jayro Mattson is a 73 y.o. male.    Chief Complaint: Follow-up      Jayro is here for follow-up of imbalance, dizziness. Completed PT. Doing much better. Happy with result.    Has some rhinitis. Does not use medications.     Review of Systems   Constitutional: Negative for activity change and appetite change.   Respiratory: Negative for difficulty breathing and wheezing   Cardiovascular: Negative for chest pain.      Objective:        Constitutional:   Vital signs are normal. He appears well-developed and well-nourished.     Head:  Normocephalic and atraumatic.     Ears:  Hearing normal to normal and whispered voice; external ear normal without scars, lesions, or masses; ear canal, tympanic membrane, and middle ear normal..   Mild nonobs cerumen cleaned    Nose:  Nose normal including turbinates, nasal mucosa, sinuses and nasal septum.         Tests / Results:  None    Assessment:       1. Dizziness and giddiness    2. Rhinitis, unspecified type          Plan:       Did well following PT. Continue exercises    Flonase for rhinitis, possible vasomotor component as well but will see how he responds. Fu prn persistent issues

## 2019-03-13 ENCOUNTER — TELEPHONE (OUTPATIENT)
Dept: FAMILY MEDICINE | Facility: CLINIC | Age: 74
End: 2019-03-13

## 2019-03-13 NOTE — TELEPHONE ENCOUNTER
----- Message from Sy Fernández sent at 3/13/2019  3:42 PM CDT -----  Type: Needs Medical Advice    Who Called:  Dena Mattson (Spouse)    Best Call Back Number: (Cell) [phone], (Home) [phone]  Additional Information: Wife states that she would like a callback regarding rescheduling the patient's nurse visit on 03/20

## 2019-03-14 NOTE — TELEPHONE ENCOUNTER
Mrs. Mattson asked for an appt for 3/19/2019 ~ I had no availability for 3/19/2019~ Mrs. Mattson requested I do not cancel the appt for 3/20/2019 and she would contact me directly after speaking to her  to see if he want to keep the date since there is no availability on 3/19 or to see what other day he would be available for scheduling.

## 2019-03-20 ENCOUNTER — OFFICE VISIT (OUTPATIENT)
Dept: FAMILY MEDICINE | Facility: CLINIC | Age: 74
End: 2019-03-20
Payer: MEDICARE

## 2019-03-20 VITALS
HEART RATE: 78 BPM | DIASTOLIC BLOOD PRESSURE: 84 MMHG | BODY MASS INDEX: 28.15 KG/M2 | SYSTOLIC BLOOD PRESSURE: 138 MMHG | OXYGEN SATURATION: 97 % | WEIGHT: 201.06 LBS | HEIGHT: 71 IN

## 2019-03-20 DIAGNOSIS — Z00.00 ENCOUNTER FOR PREVENTIVE HEALTH EXAMINATION: Primary | ICD-10-CM

## 2019-03-20 DIAGNOSIS — N40.0 BENIGN PROSTATIC HYPERPLASIA: ICD-10-CM

## 2019-03-20 DIAGNOSIS — I10 ESSENTIAL HYPERTENSION: ICD-10-CM

## 2019-03-20 DIAGNOSIS — R97.20 ELEVATED PSA: Chronic | ICD-10-CM

## 2019-03-20 DIAGNOSIS — H40.9 GLAUCOMA, UNSPECIFIED GLAUCOMA TYPE, UNSPECIFIED LATERALITY: Chronic | ICD-10-CM

## 2019-03-20 PROCEDURE — 3075F SYST BP GE 130 - 139MM HG: CPT | Mod: HCNC,CPTII,S$GLB, | Performed by: NURSE PRACTITIONER

## 2019-03-20 PROCEDURE — G0439 PR MEDICARE ANNUAL WELLNESS SUBSEQUENT VISIT: ICD-10-PCS | Mod: HCNC,S$GLB,, | Performed by: NURSE PRACTITIONER

## 2019-03-20 PROCEDURE — G0439 PPPS, SUBSEQ VISIT: HCPCS | Mod: HCNC,S$GLB,, | Performed by: NURSE PRACTITIONER

## 2019-03-20 PROCEDURE — 3079F DIAST BP 80-89 MM HG: CPT | Mod: HCNC,CPTII,S$GLB, | Performed by: NURSE PRACTITIONER

## 2019-03-20 PROCEDURE — 3075F PR MOST RECENT SYSTOLIC BLOOD PRESS GE 130-139MM HG: ICD-10-PCS | Mod: HCNC,CPTII,S$GLB, | Performed by: NURSE PRACTITIONER

## 2019-03-20 PROCEDURE — 99999 PR PBB SHADOW E&M-EST. PATIENT-LVL V: ICD-10-PCS | Mod: PBBFAC,HCNC,, | Performed by: NURSE PRACTITIONER

## 2019-03-20 PROCEDURE — 3079F PR MOST RECENT DIASTOLIC BLOOD PRESSURE 80-89 MM HG: ICD-10-PCS | Mod: HCNC,CPTII,S$GLB, | Performed by: NURSE PRACTITIONER

## 2019-03-20 PROCEDURE — 99999 PR PBB SHADOW E&M-EST. PATIENT-LVL V: CPT | Mod: PBBFAC,HCNC,, | Performed by: NURSE PRACTITIONER

## 2019-03-20 RX ORDER — VERAPAMIL HYDROCHLORIDE 180 MG/1
180 CAPSULE, EXTENDED RELEASE ORAL DAILY
Qty: 90 CAPSULE | Refills: 3 | Status: SHIPPED | OUTPATIENT
Start: 2019-03-20 | End: 2019-09-20 | Stop reason: SDUPTHER

## 2019-03-20 RX ORDER — TAMSULOSIN HYDROCHLORIDE 0.4 MG/1
CAPSULE ORAL
Qty: 180 CAPSULE | Refills: 3 | Status: SHIPPED | OUTPATIENT
Start: 2019-03-20 | End: 2019-09-20 | Stop reason: SDUPTHER

## 2019-03-20 NOTE — PROGRESS NOTES
"Jayro Mattson presented for a  Medicare AWV and comprehensive Health Risk Assessment today. The following components were reviewed and updated:    · Medical history  · Family History  · Social history  · Allergies and Current Medications  · Health Risk Assessment  · Health Maintenance  · Care Team     ** See Completed Assessments for Annual Wellness Visit within the encounter summary.**       The following assessments were completed:  · Living Situation  · CAGE  · Depression Screening  · Timed Get Up and Go  · Whisper Test  · Cognitive Function Screening  · Nutrition Screening  · ADL Screening  · PAQ Screening    Vitals:    03/20/19 1046   BP: 138/84   BP Location: Left arm   Patient Position: Sitting   BP Method: Medium (Manual)   Pulse: 78   SpO2: 97%   Weight: 91.2 kg (201 lb 1 oz)   Height: 5' 11" (1.803 m)     Body mass index is 28.04 kg/m².  Physical Exam   Constitutional: He is oriented to person, place, and time. He appears well-nourished. No distress.   Cardiovascular: Normal rate, regular rhythm, normal heart sounds and intact distal pulses.   Pulmonary/Chest: Effort normal and breath sounds normal. He has no wheezes. He has no rales.   Abdominal: Soft. Bowel sounds are normal. There is no tenderness.   Neurological: He is alert and oriented to person, place, and time.   Skin: Skin is warm and dry. No rash noted.   Psychiatric: He has a normal mood and affect. His behavior is normal. Judgment and thought content normal.   Vitals reviewed.        Diagnoses and health risks identified today and associated recommendations/orders:    1. Encounter for preventive health examination  Reviewed health maintenance and provided recommendations    declines tdap and shingrix due to cost at this time    2. Essential hypertension  Continue to monitor  Followed by Franko Salas Jr, MD .    - verapamil (VERELAN) 180 MG C24P; Take 1 capsule (180 mg total) by mouth once daily.  Dispense: 90 capsule; Refill: 3    3. Benign " prostatic hyperplasia  Continue to monitor  Followed by Franko Salas Jr, MD .    - tamsulosin (FLOMAX) 0.4 mg Cap; TAKE 2 CAPSULES NIGHTLY  Dispense: 180 capsule; Refill: 3    4. Elevated PSA  Continue to monitor  Followed by Franko Salas Jr, MD .      5. Glaucoma, unspecified glaucoma type, unspecified laterality  Continue to monitor  Followed by Sean Thornton)  Follow ophthalmology recommendations .        Provided Jayro with a 5-10 year written screening schedule and personal prevention plan. Recommendations were developed using the USPSTF age appropriate recommendations. Education, counseling, and referrals were provided as needed. After Visit Summary printed and given to patient which includes a list of additional screenings\tests needed.    Follow-up in about 1 year (around 3/20/2020).    Mely Triana NP

## 2019-03-20 NOTE — PATIENT INSTRUCTIONS
Counseling and Referral of Other Preventative  (Italic type indicates deductible and co-insurance are waived)    Patient Name: Jayro Mattson  Today's Date: 3/20/2019    Health Maintenance       Date Due Completion Date    TETANUS VACCINE 08/10/1963 ---    Colonoscopy 04/16/2022 4/16/2012    Lipid Panel 08/07/2023 8/7/2018        No orders of the defined types were placed in this encounter.    The following information is provided to all patients.  This information is to help you find resources for any of the problems found today that may be affecting your health:                Living healthy guide: www.[facility].[facility]      Understanding Diabetes: www.diabetes.org      Eating healthy: www.cdc.gov/healthyweight      CDC home safety checklist: www.cdc.gov/steadi/patient.html      Agency on Aging: www.goea.[place].[facility]      Alcoholics anonymous (AA): www.aa.org      Physical Activity: www.riki.nih.gov/gf4ovpx      Tobacco use: www.quitwithusla.org

## 2019-05-21 ENCOUNTER — OFFICE VISIT (OUTPATIENT)
Dept: FAMILY MEDICINE | Facility: CLINIC | Age: 74
End: 2019-05-21
Payer: MEDICARE

## 2019-05-21 ENCOUNTER — LAB VISIT (OUTPATIENT)
Dept: LAB | Facility: HOSPITAL | Age: 74
End: 2019-05-21
Attending: EMERGENCY MEDICINE
Payer: MEDICARE

## 2019-05-21 VITALS
SYSTOLIC BLOOD PRESSURE: 118 MMHG | RESPIRATION RATE: 17 BRPM | HEART RATE: 82 BPM | OXYGEN SATURATION: 94 % | HEIGHT: 71 IN | WEIGHT: 199.94 LBS | DIASTOLIC BLOOD PRESSURE: 78 MMHG | BODY MASS INDEX: 27.99 KG/M2

## 2019-05-21 DIAGNOSIS — I10 ESSENTIAL HYPERTENSION: Chronic | ICD-10-CM

## 2019-05-21 DIAGNOSIS — I10 ESSENTIAL HYPERTENSION: Primary | Chronic | ICD-10-CM

## 2019-05-21 DIAGNOSIS — R97.20 ELEVATED PSA: Chronic | ICD-10-CM

## 2019-05-21 DIAGNOSIS — Z12.11 SCREENING FOR COLON CANCER: ICD-10-CM

## 2019-05-21 DIAGNOSIS — H40.9 GLAUCOMA, UNSPECIFIED GLAUCOMA TYPE, UNSPECIFIED LATERALITY: Chronic | ICD-10-CM

## 2019-05-21 LAB
BASOPHILS # BLD AUTO: 0.04 K/UL (ref 0–0.2)
BASOPHILS NFR BLD: 0.9 % (ref 0–1.9)
DIFFERENTIAL METHOD: ABNORMAL
EOSINOPHIL # BLD AUTO: 0.2 K/UL (ref 0–0.5)
EOSINOPHIL NFR BLD: 4.8 % (ref 0–8)
ERYTHROCYTE [DISTWIDTH] IN BLOOD BY AUTOMATED COUNT: 13.2 % (ref 11.5–14.5)
HCT VFR BLD AUTO: 38.3 % (ref 40–54)
HGB BLD-MCNC: 12.6 G/DL (ref 14–18)
IMM GRANULOCYTES # BLD AUTO: 0.01 K/UL (ref 0–0.04)
IMM GRANULOCYTES NFR BLD AUTO: 0.2 % (ref 0–0.5)
LYMPHOCYTES # BLD AUTO: 1.4 K/UL (ref 1–4.8)
LYMPHOCYTES NFR BLD: 30.4 % (ref 18–48)
MCH RBC QN AUTO: 29.9 PG (ref 27–31)
MCHC RBC AUTO-ENTMCNC: 32.9 G/DL (ref 32–36)
MCV RBC AUTO: 91 FL (ref 82–98)
MONOCYTES # BLD AUTO: 0.5 K/UL (ref 0.3–1)
MONOCYTES NFR BLD: 10.7 % (ref 4–15)
NEUTROPHILS # BLD AUTO: 2.4 K/UL (ref 1.8–7.7)
NEUTROPHILS NFR BLD: 53 % (ref 38–73)
NRBC BLD-RTO: 0 /100 WBC
PLATELET # BLD AUTO: 154 K/UL (ref 150–350)
PMV BLD AUTO: 12.8 FL (ref 9.2–12.9)
RBC # BLD AUTO: 4.21 M/UL (ref 4.6–6.2)
WBC # BLD AUTO: 4.6 K/UL (ref 3.9–12.7)

## 2019-05-21 PROCEDURE — 99214 PR OFFICE/OUTPT VISIT, EST, LEVL IV, 30-39 MIN: ICD-10-PCS | Mod: HCNC,S$GLB,, | Performed by: EMERGENCY MEDICINE

## 2019-05-21 PROCEDURE — 99214 OFFICE O/P EST MOD 30 MIN: CPT | Mod: HCNC,S$GLB,, | Performed by: EMERGENCY MEDICINE

## 2019-05-21 PROCEDURE — 84153 ASSAY OF PSA TOTAL: CPT | Mod: HCNC

## 2019-05-21 PROCEDURE — 3074F PR MOST RECENT SYSTOLIC BLOOD PRESSURE < 130 MM HG: ICD-10-PCS | Mod: HCNC,CPTII,S$GLB, | Performed by: EMERGENCY MEDICINE

## 2019-05-21 PROCEDURE — 99499 UNLISTED E&M SERVICE: CPT | Mod: HCNC,S$GLB,, | Performed by: EMERGENCY MEDICINE

## 2019-05-21 PROCEDURE — 99999 PR PBB SHADOW E&M-EST. PATIENT-LVL IV: ICD-10-PCS | Mod: PBBFAC,HCNC,, | Performed by: EMERGENCY MEDICINE

## 2019-05-21 PROCEDURE — 99499 RISK ADDL DX/OHS AUDIT: ICD-10-PCS | Mod: HCNC,S$GLB,, | Performed by: EMERGENCY MEDICINE

## 2019-05-21 PROCEDURE — 3074F SYST BP LT 130 MM HG: CPT | Mod: HCNC,CPTII,S$GLB, | Performed by: EMERGENCY MEDICINE

## 2019-05-21 PROCEDURE — 1101F PR PT FALLS ASSESS DOC 0-1 FALLS W/OUT INJ PAST YR: ICD-10-PCS | Mod: HCNC,CPTII,S$GLB, | Performed by: EMERGENCY MEDICINE

## 2019-05-21 PROCEDURE — 36415 COLL VENOUS BLD VENIPUNCTURE: CPT | Mod: HCNC,PO

## 2019-05-21 PROCEDURE — 85025 COMPLETE CBC W/AUTO DIFF WBC: CPT | Mod: HCNC

## 2019-05-21 PROCEDURE — 3078F DIAST BP <80 MM HG: CPT | Mod: HCNC,CPTII,S$GLB, | Performed by: EMERGENCY MEDICINE

## 2019-05-21 PROCEDURE — 1101F PT FALLS ASSESS-DOCD LE1/YR: CPT | Mod: HCNC,CPTII,S$GLB, | Performed by: EMERGENCY MEDICINE

## 2019-05-21 PROCEDURE — 80053 COMPREHEN METABOLIC PANEL: CPT | Mod: HCNC

## 2019-05-21 PROCEDURE — 99999 PR PBB SHADOW E&M-EST. PATIENT-LVL IV: CPT | Mod: PBBFAC,HCNC,, | Performed by: EMERGENCY MEDICINE

## 2019-05-21 PROCEDURE — 3078F PR MOST RECENT DIASTOLIC BLOOD PRESSURE < 80 MM HG: ICD-10-PCS | Mod: HCNC,CPTII,S$GLB, | Performed by: EMERGENCY MEDICINE

## 2019-05-21 NOTE — PROGRESS NOTES
Subjective:   THIS NOTE IS DONE WITH VOICE RECOGNITION        Patient ID: Kelly Mattson is a 73 y.o. male.    Chief Complaint: Hypertension      HPI     Here to follow up on blood pressure.  Taking current medications.  He is not experiencing chest pain.  He continues to work cord physically.  He has not had any anginal symptoms.  No issues with his medications.    BP Readings from Last 3 Encounters:   05/21/19 118/78   03/20/19 138/84   08/02/18 104/70     We did discuss his elevated PSA.  We will plan to update a diagnostic PSA today.    Results for KELLY MATTSON (MRN [id number]) as of 5/21/2019 14:10   Ref. Range 11/10/2014 11:57 4/23/2015 07:40 10/29/2015 10:21 10/18/2016 12:28 8/7/2018 07:49   PSA DIAGNOSTIC Latest Ref Range: 0.00 - 4.00 ng/mL 7.5 (H) 7.0 (H) 6.0 (H) 6.8 (H) 6.0 (H)     Brother, younger, has been diagnosed with pancreatic cancer.  Kelly is interested in appropriate screening.  We discussed this at length.  There is no obvious screening recommended for him.    Some issues with balance that improved with physical therapy.    Immunization History   Administered Date(s) Administered    Influenza - High Dose 11/14/2014, 10/18/2016, 11/20/2018    Influenza Split 02/10/2012    Pneumococcal Conjugate - 13 Valent 10/18/2016    Pneumococcal Polysaccharide - 23 Valent 05/06/2014       Current Outpatient Medications   Medication Sig Dispense Refill    bimatoprost (LUMIGAN) 0.03 % ophthalmic drops Place 1 drop into both eyes nightly. Every evening      brimonidine (ALPHAGAN) 0.2 % ophthalmic solution Place 1 drop into both eyes 3 (three) times daily. Three times a day      cholecalciferol, vitamin D3, (VITAMIN D-3) 1,000 unit Chew Take 1 tablet by mouth once daily. Every day      coenzyme Q10 (CO Q-10) 100 mg capsule Take 1 capsule by mouth 2 (two) times daily. Every day      dorzolamide-timolol (COSOPT) 2-0.5 % ophthalmic solution Place 1 drop into both eyes 2 (two) times daily. Three times a day       DUREZOL 0.05 % Drop ophthalmic solution       fish oil-omega-3 fatty acids 300-1,000 mg capsule Take 2 g by mouth once daily.      fluticasone (FLONASE) 50 mcg/actuation nasal spray 2 sprays (100 mcg total) by Each Nare route once daily. 1 Bottle 12    latanoprost 0.005 % ophthalmic solution       lutein 10 mg Tab Take by mouth.      magnesium oxide-Mg AA chelate (MG-PLUS-PROTEIN) 133 mg Tab Take by mouth.      pilocarpine (PILOCAR) 4 % ophthalmic solution Place 1 drop into both eyes 3 (three) times daily. Three times a day      pilocarpine HCL 2% (PILOCAR) 2 % ophthalmic solution       tadalafil (CIALIS) 20 MG tablet Take 1 tablet (20 mg total) by mouth daily as needed for Erectile Dysfunction. 6 tablet 4    tamsulosin (FLOMAX) 0.4 mg Cap TAKE 2 CAPSULES NIGHTLY 180 capsule 3    verapamil (VERELAN) 180 MG C24P Take 1 capsule (180 mg total) by mouth once daily. 90 capsule 3     No current facility-administered medications for this visit.          Review of Systems   Constitutional: Negative for activity change, appetite change, fatigue and unexpected weight change.   HENT: Negative for postnasal drip, rhinorrhea, sinus pressure, sneezing, sore throat, trouble swallowing and voice change.    Eyes: Positive for visual disturbance. Negative for discharge.        No double vision. No unusual tearing.   Respiratory: Negative for cough, shortness of breath and wheezing.         No hemoptysis.   Cardiovascular:        No suggestion of angina. No PND or orthopnea. No unusual peripheral edema. No syncope. No suggestion of tachycardia.   Gastrointestinal: Negative for abdominal distention, abdominal pain, blood in stool and constipation.        No dysphasia or difficulty swallowing.   Genitourinary: Negative for dysuria, frequency, hematuria, scrotal swelling, testicular pain and urgency.        No nocturia.   Musculoskeletal: Negative for arthralgias, gait problem, joint swelling and neck pain.        Frequent  muscle cramps, calf, chest wall, neck.   Skin: Negative for rash and wound.        No complaints of changing skin lesions. No new pigmented lesions. No erosions. No complaints of itching or burning.   Neurological: Negative for tremors, seizures, weakness, light-headedness, numbness and headaches.   Hematological: Negative for adenopathy. Does not bruise/bleed easily.   Psychiatric/Behavioral: Negative for behavioral problems, dysphoric mood and sleep disturbance.       Objective:      Physical Exam   Constitutional: He is oriented to person, place, and time. He appears well-developed and well-nourished. No distress.   HENT:   Head: Normocephalic and atraumatic.   Right Ear: External ear normal.   Left Ear: External ear normal.   Nose: Nose normal.   Mouth/Throat: Oropharynx is clear and moist. No oropharyngeal exudate.   Eyes: Pupils are equal, round, and reactive to light. Conjunctivae and EOM are normal. No scleral icterus.   Both eyes with conjunctivae of irritation, which he attributes to his drops for his glaucoma.  No eye pain. No foreign body.   Neck: Normal range of motion. Neck supple. No thyromegaly present.   Cardiovascular: Normal rate, regular rhythm and normal heart sounds.   Pulmonary/Chest: Effort normal and breath sounds normal. He has no wheezes. He has no rales.   Abdominal: Soft. Bowel sounds are normal. He exhibits no distension. There is no tenderness.   Musculoskeletal: Normal range of motion. He exhibits no edema or tenderness.   Lymphadenopathy:     He has no cervical adenopathy.   Neurological: He is alert and oriented to person, place, and time. He has normal reflexes. He exhibits normal muscle tone. Coordination normal.   Skin: Skin is warm and dry. No rash noted.   Psychiatric: He has a normal mood and affect. His behavior is normal.   Vitals reviewed.      Assessment:       1. Essential hypertension    2. Elevated PSA    3. Glaucoma, unspecified glaucoma type, unspecified laterality     4. Screening for colon cancer        Plan:       1. Essential hypertension  Controlled.  Continue current medications  Continue to avoid excessive sodium  Continue home monitoring  Target blood pressure is 139/89 or less  - Comprehensive metabolic panel; Future    2. Elevated PSA  Stable, no obstructive symptomatology  Update diagnostic PSA    - PROSTATE SPECIFIC ANTIGEN, DIAGNOSTIC; Future  - CBC auto differential; Future    3. Glaucoma, unspecified glaucoma type, unspecified laterality  Followed closely by Ophthalmology  He is very much aware of the need for regular use of his eyedrops.    4. Screening for colon cancer  History of adenomatous polyps  Due for repeat colonoscopy, at his convenience.    - Case request GI: COLONOSCOPY

## 2019-05-21 NOTE — PATIENT INSTRUCTIONS
Jayro,    There are no recommended screens for pancreatic cancer.    I did order blood work to check prostate, liver, kidney, and blood count.    I will send the results to your [employer] account.    Your blood pressure is excellent.    I have submitted a request to repeat your colonoscopy.  That team should call you to make arrangements.    Franko Salas MD

## 2019-05-22 LAB
ALBUMIN SERPL BCP-MCNC: 3.7 G/DL (ref 3.5–5.2)
ALP SERPL-CCNC: 84 U/L (ref 55–135)
ALT SERPL W/O P-5'-P-CCNC: 11 U/L (ref 10–44)
ANION GAP SERPL CALC-SCNC: 7 MMOL/L (ref 8–16)
AST SERPL-CCNC: 17 U/L (ref 10–40)
BILIRUB SERPL-MCNC: 0.3 MG/DL (ref 0.1–1)
BUN SERPL-MCNC: 19 MG/DL (ref 8–23)
CALCIUM SERPL-MCNC: 9.8 MG/DL (ref 8.7–10.5)
CHLORIDE SERPL-SCNC: 109 MMOL/L (ref 95–110)
CO2 SERPL-SCNC: 26 MMOL/L (ref 23–29)
COMPLEXED PSA SERPL-MCNC: 6.1 NG/ML (ref 0–4)
CREAT SERPL-MCNC: 1.1 MG/DL (ref 0.5–1.4)
EST. GFR  (AFRICAN AMERICAN): >60 ML/MIN/1.73 M^2
EST. GFR  (NON AFRICAN AMERICAN): >60 ML/MIN/1.73 M^2
GLUCOSE SERPL-MCNC: 99 MG/DL (ref 70–110)
POTASSIUM SERPL-SCNC: 4.3 MMOL/L (ref 3.5–5.1)
PROT SERPL-MCNC: 7.5 G/DL (ref 6–8.4)
SODIUM SERPL-SCNC: 142 MMOL/L (ref 136–145)

## 2019-07-11 ENCOUNTER — TELEPHONE (OUTPATIENT)
Dept: GASTROENTEROLOGY | Facility: CLINIC | Age: 74
End: 2019-07-11

## 2019-07-11 NOTE — TELEPHONE ENCOUNTER
----- Message from Laurel Azul sent at 7/11/2019  2:55 PM CDT -----  Contact: Jayro  Type: Needs Medical Advice    Who Called:  patient  Best Call Back Number: [phone]  Additional Information: requesting a call back to schedule his lower GI--please advise--thank you

## 2019-07-20 ENCOUNTER — TELEPHONE (OUTPATIENT)
Dept: GASTROENTEROLOGY | Facility: CLINIC | Age: 74
End: 2019-07-20

## 2019-07-22 ENCOUNTER — TELEPHONE (OUTPATIENT)
Dept: GASTROENTEROLOGY | Facility: CLINIC | Age: 74
End: 2019-07-22

## 2019-07-22 NOTE — TELEPHONE ENCOUNTER
----- Message from Sy Fernández sent at 7/22/2019  4:22 PM CDT -----  Type: Needs Medical Advice    Who Called:  Patient    Best Call Back Number: [phone]  Additional Information: Patient states that he would like a callback regarding scheduling a colonoscopy.

## 2019-07-22 NOTE — TELEPHONE ENCOUNTER
----- Message from Isamar Gaspar sent at 7/22/2019 12:08 PM CDT -----  Contact: Patient  Type: Needs Medical Advice    Who Called:  Patient  Symptoms (please be specific):  na  How long has patient had these symptoms:  fish  Pharmacy name and phone #:  fish  Best Call Back Number:  [phone] (work)   Additional Information: Patient requesting a call to discuss scheduling colonoscopy. Please call to discuss, thank you!

## 2019-07-23 NOTE — TELEPHONE ENCOUNTER
Pt scheduled for colonoscopy. Prep Instructions and confirmation letter mailed to address on file. Pt verbalized understanding.

## 2019-09-12 ENCOUNTER — IMMUNIZATION (OUTPATIENT)
Dept: PHARMACY | Facility: CLINIC | Age: 74
End: 2019-09-12
Payer: MEDICARE

## 2019-09-20 DIAGNOSIS — I10 ESSENTIAL HYPERTENSION: ICD-10-CM

## 2019-09-20 DIAGNOSIS — N40.0 BENIGN PROSTATIC HYPERPLASIA: ICD-10-CM

## 2019-09-20 RX ORDER — VERAPAMIL HYDROCHLORIDE 180 MG/1
180 CAPSULE, EXTENDED RELEASE ORAL DAILY
Qty: 90 CAPSULE | Refills: 3 | Status: SHIPPED | OUTPATIENT
Start: 2019-09-20 | End: 2019-12-31 | Stop reason: SDUPTHER

## 2019-09-20 RX ORDER — TAMSULOSIN HYDROCHLORIDE 0.4 MG/1
CAPSULE ORAL
Qty: 180 CAPSULE | Refills: 3 | Status: SHIPPED | OUTPATIENT
Start: 2019-09-20 | End: 2020-04-06 | Stop reason: SDUPTHER

## 2019-09-26 ENCOUNTER — ANESTHESIA (OUTPATIENT)
Dept: ENDOSCOPY | Facility: HOSPITAL | Age: 74
End: 2019-09-26
Payer: MEDICARE

## 2019-09-26 ENCOUNTER — ANESTHESIA EVENT (OUTPATIENT)
Dept: ENDOSCOPY | Facility: HOSPITAL | Age: 74
End: 2019-09-26
Payer: MEDICARE

## 2019-09-26 ENCOUNTER — HOSPITAL ENCOUNTER (OUTPATIENT)
Facility: HOSPITAL | Age: 74
Discharge: HOME | End: 2019-09-26
Attending: INTERNAL MEDICINE | Admitting: INTERNAL MEDICINE
Payer: MEDICARE

## 2019-09-26 VITALS
RESPIRATION RATE: 16 BRPM | WEIGHT: 200 LBS | DIASTOLIC BLOOD PRESSURE: 84 MMHG | HEIGHT: 69 IN | SYSTOLIC BLOOD PRESSURE: 155 MMHG | BODY MASS INDEX: 29.62 KG/M2 | OXYGEN SATURATION: 99 % | HEART RATE: 83 BPM | TEMPERATURE: 97 F

## 2019-09-26 DIAGNOSIS — Z12.11 COLON CANCER SCREENING: ICD-10-CM

## 2019-09-26 PROCEDURE — 63600175 PHARM REV CODE 636 W HCPCS: Mod: HCNC,PO | Performed by: NURSE ANESTHETIST, CERTIFIED REGISTERED

## 2019-09-26 PROCEDURE — G0121 COLON CA SCRN NOT HI RSK IND: ICD-10-PCS | Mod: HCNC,,, | Performed by: INTERNAL MEDICINE

## 2019-09-26 PROCEDURE — D9220A PRA ANESTHESIA: ICD-10-PCS | Mod: HCNC,ANES,, | Performed by: ANESTHESIOLOGY

## 2019-09-26 PROCEDURE — D9220A PRA ANESTHESIA: Mod: HCNC,ANES,, | Performed by: ANESTHESIOLOGY

## 2019-09-26 PROCEDURE — 63600175 PHARM REV CODE 636 W HCPCS: Mod: HCNC,PO | Performed by: INTERNAL MEDICINE

## 2019-09-26 PROCEDURE — G0121 COLON CA SCRN NOT HI RSK IND: HCPCS | Mod: HCNC,,, | Performed by: INTERNAL MEDICINE

## 2019-09-26 PROCEDURE — 37000008 HC ANESTHESIA 1ST 15 MINUTES: Mod: HCNC,PO | Performed by: INTERNAL MEDICINE

## 2019-09-26 PROCEDURE — 37000009 HC ANESTHESIA EA ADD 15 MINS: Mod: HCNC,PO | Performed by: INTERNAL MEDICINE

## 2019-09-26 PROCEDURE — G0105 COLORECTAL SCRN; HI RISK IND: HCPCS | Mod: HCNC,PO | Performed by: INTERNAL MEDICINE

## 2019-09-26 PROCEDURE — G0121 COLON CA SCRN NOT HI RSK IND: HCPCS | Mod: HCNC,PO | Performed by: INTERNAL MEDICINE

## 2019-09-26 RX ORDER — SODIUM CHLORIDE, SODIUM LACTATE, POTASSIUM CHLORIDE, CALCIUM CHLORIDE 600; 310; 30; 20 MG/100ML; MG/100ML; MG/100ML; MG/100ML
INJECTION, SOLUTION INTRAVENOUS CONTINUOUS
Status: DISCONTINUED | OUTPATIENT
Start: 2019-09-26 | End: 2019-09-26 | Stop reason: HOSPADM

## 2019-09-26 RX ORDER — PROPOFOL 10 MG/ML
VIAL (ML) INTRAVENOUS
Status: DISCONTINUED | OUTPATIENT
Start: 2019-09-26 | End: 2019-09-26

## 2019-09-26 RX ORDER — LIDOCAINE HCL/PF 100 MG/5ML
SYRINGE (ML) INTRAVENOUS
Status: DISCONTINUED | OUTPATIENT
Start: 2019-09-26 | End: 2019-09-26

## 2019-09-26 RX ORDER — SODIUM CHLORIDE 0.9 % (FLUSH) 0.9 %
10 SYRINGE (ML) INJECTION
Status: DISCONTINUED | OUTPATIENT
Start: 2019-09-26 | End: 2019-09-26 | Stop reason: HOSPADM

## 2019-09-26 RX ADMIN — PROPOFOL 40 MG: 10 INJECTION, EMULSION INTRAVENOUS at 08:09

## 2019-09-26 RX ADMIN — PROPOFOL 50 MG: 10 INJECTION, EMULSION INTRAVENOUS at 08:09

## 2019-09-26 RX ADMIN — LIDOCAINE HYDROCHLORIDE 100 MG: 20 INJECTION, SOLUTION INTRAVENOUS at 08:09

## 2019-09-26 RX ADMIN — SODIUM CHLORIDE, SODIUM LACTATE, POTASSIUM CHLORIDE, AND CALCIUM CHLORIDE: .6; .31; .03; .02 INJECTION, SOLUTION INTRAVENOUS at 08:09

## 2019-09-26 RX ADMIN — PROPOFOL 30 MG: 10 INJECTION, EMULSION INTRAVENOUS at 08:09

## 2019-09-26 RX ADMIN — PROPOFOL 100 MG: 10 INJECTION, EMULSION INTRAVENOUS at 08:09

## 2019-09-26 NOTE — ANESTHESIA PREPROCEDURE EVALUATION
Pre-op Assessment         Review of Systems  Anesthesia Hx:  No problems with previous Anesthesia    Social:  Non-Smoker, No Alcohol Use    Hematology/Oncology:  Hematology Normal        Cardiovascular:   Hypertension, well controlled    Pulmonary:  Pulmonary Normal    Renal/:   Denies Chronic Renal Disease.  Stones, no failure   Neurological:  Neurology Normal        Physical Exam  General:  Well nourished    Airway/Jaw/Neck:  Airway Findings: Mouth Opening: Normal Mallampati: II  TM Distance: Normal, at least 6 cm      Dental:  Dental Findings: Edentulous   Chest/Lungs:  Chest/Lungs Findings: Clear to auscultation, Normal Respiratory Rate     Heart/Vascular:  Heart Findings: Rate: Normal  Rhythm: Regular Rhythm  Sounds: Normal  Heart Murmur        Mental Status:  Mental Status Findings:  Cooperative, Alert and Oriented         Anesthesia Plan  Type of Anesthesia, risks & benefits discussed:  Anesthesia Type:  general  Patient's Preference:   Intra-op Monitoring Plan:   Intra-op Monitoring Plan Comments:   Post Op Pain Control Plan:   Post Op Pain Control Plan Comments:   Induction:   IV  Beta Blocker:         Informed Consent: Patient understands risks and agrees with Anesthesia plan.  Questions answered.   ASA Score: 2     Day of Surgery Review of History & Physical:    H&P update referred to the surgeon.         Ready For Surgery From Anesthesia Perspective.

## 2019-09-26 NOTE — ANESTHESIA POSTPROCEDURE EVALUATION
Anesthesia Post Evaluation    Patient: Jayro Mattson    Procedure(s) Performed: Procedure(s) (LRB):  COLONOSCOPY (N/A)    Final Anesthesia Type: general  Patient location during evaluation: PACU  Patient participation: Yes- Able to Participate  Level of consciousness: awake and alert, oriented and awake  Post-procedure vital signs: reviewed and stable  Pain management: adequate  Airway patency: patent  PONV status at discharge: No PONV  Anesthetic complications: no      Cardiovascular status: blood pressure returned to baseline and hemodynamically stable  Respiratory status: unassisted, spontaneous ventilation and room air  Hydration status: euvolemic  Follow-up not needed.          Vitals Value Taken Time   /84 9/26/2019  9:24 AM   Temp 36.1 °C (97 °F) 9/26/2019  9:24 AM   Pulse 83 9/26/2019  9:24 AM   Resp 16 9/26/2019  9:24 AM   SpO2 99 % 9/26/2019  9:24 AM         Event Time     Out of Recovery 09:30:00          Pain/Malaika Score: Malaika Score: 10 (9/26/2019  9:24 AM)

## 2019-09-26 NOTE — H&P
History & Physical - Short Stay  Gastroenterology      SUBJECTIVE:     Procedure: Colonoscopy    Chief Complaint/Indication for Procedure: Screening    History of Present Illness:  Asymptomatic    Shilpa UNGER CochranBRIANNA     4:24 PM   Note      ----- Message from Laurel Azul sent at 7/11/2019  2:55 PM CDT -----  Contact: Jayro  Type: Needs Medical Advice     Who Called:  patient  Best Call Back Number: [phone]  Additional Information: requesting a call back to schedule his lower GI--please advise--thank you          See last colonoscopy 4/16/2012  Impression:  - (Minimal) Diverticulosis in the sigmoid colon.                       - Anal papilla(e) were hypertrophied.                       - Perianal skin tags.                       - Rectal exam revealed enlarged prostate.                       - Redundant colon.                       - The examination was otherwise normal.                       - The examined portion of the ileum was normal.  Recommendation:      - Discharge patient to home.                       - High fiber diet.                       - Repeat colonoscopy in 7-8 years for surveillance.  Gino Nance MD  4/16/2012      Office Visit  5/21/2019  [facility]      Franko Salas Jr., MD   Family Medicine   Essential hypertension +3 more   Dx   Hypertension   Reason for Visit      Jayro,     There are no recommended screens for pancreatic cancer.     I did order blood work to check prostate, liver, kidney, and blood count.     I will send the results to your [facility] account.     Your blood pressure is excellent.     I have submitted a request to repeat your colonoscopy.  That team should call you to make arrangements.     Franok Salas MD                Progress Notes     Expand All Collapse All    Subjective:   THIS NOTE IS DONE WITH VOICE RECOGNITION         Patient ID: Jayro Mattson is a 73 y.o. male.     Chief Complaint: Hypertension        HPI      Here to  follow up on blood pressure.  Taking current medications.  He is not experiencing chest pain.  He continues to work cord physically.  He has not had any anginal symptoms.  No issues with his medications.         BP Readings from Last 3 Encounters:   05/21/19 118/78   03/20/19 138/84   08/02/18 104/70      We did discuss his elevated PSA.  We will plan to update a diagnostic PSA today.     Results for KELLY STARKS (MRN [id number]) as of 5/21/2019 14:10    Ref. Range 11/10/2014 11:57 4/23/2015 07:40 10/29/2015 10:21 10/18/2016 12:28 8/7/2018 07:49   PSA DIAGNOSTIC Latest Ref Range: 0.00 - 4.00 ng/mL 7.5 (H) 7.0 (H) 6.0 (H) 6.8 (H) 6.0 (H)      Brother, younger, has been diagnosed with pancreatic cancer.  Kelly is interested in appropriate screening.  We discussed this at length.  There is no obvious screening recommended for him.     Some issues with balance that improved with physical therapy.    Assessment:       1. Essential hypertension    2. Elevated PSA    3. Glaucoma, unspecified glaucoma type, unspecified laterality    4. Screening for colon cancer        Plan:       1. Essential hypertension  Controlled.  Continue current medications  Continue to avoid excessive sodium  Continue home monitoring  Target blood pressure is 139/89 or less  - Comprehensive metabolic panel; Future     2. Elevated PSA  Stable, no obstructive symptomatology  Update diagnostic PSA     - PROSTATE SPECIFIC ANTIGEN, DIAGNOSTIC; Future  - CBC auto differential; Future     3. Glaucoma, unspecified glaucoma type, unspecified laterality  Followed closely by Ophthalmology  He is very much aware of the need for regular use of his eyedrops.     4. Screening for colon cancer  History of adenomatous polyps  Due for repeat colonoscopy, at his convenience.     - Case request GI: COLONOSCOPY                   PTA Medications   Medication Sig    bimatoprost (LUMIGAN) 0.03 % ophthalmic drops Place 1 drop into both eyes nightly. Every evening     cholecalciferol, vitamin D3, (VITAMIN D-3) 1,000 unit Chew Take 1 tablet by mouth once daily. Every day    coenzyme Q10 (CO Q-10) 100 mg capsule Take 1 capsule by mouth 2 (two) times daily. Every day    fish oil-omega-3 fatty acids 300-1,000 mg capsule Take 2 g by mouth once daily.    magnesium oxide-Mg AA chelate (MG-PLUS-PROTEIN) 133 mg Tab Take by mouth.    tamsulosin (FLOMAX) 0.4 mg Cap TAKE 2 CAPSULES NIGHTLY    verapamil (VERELAN) 180 MG C24P Take 1 capsule (180 mg total) by mouth once daily.    brimonidine (ALPHAGAN) 0.2 % ophthalmic solution Place 1 drop into both eyes 3 (three) times daily. Three times a day    dorzolamide-timolol (COSOPT) 2-0.5 % ophthalmic solution Place 1 drop into both eyes 2 (two) times daily. Three times a day    DUREZOL 0.05 % Drop ophthalmic solution     fluticasone (FLONASE) 50 mcg/actuation nasal spray 2 sprays (100 mcg total) by Each Nare route once daily. (Patient not taking: Reported on 9/25/2019)    latanoprost 0.005 % ophthalmic solution     lutein 10 mg Tab Take by mouth.    pilocarpine (PILOCAR) 4 % ophthalmic solution Place 1 drop into both eyes 3 (three) times daily. Three times a day    pilocarpine HCL 2% (PILOCAR) 2 % ophthalmic solution        Review of patient's allergies indicates:   Allergen Reactions    No known allergies         Past Medical History:   Diagnosis Date    Allergy     BPH (benign prostatic hyperplasia)     Diverticulitis     Diverticulosis 3/17/2006    Seen at colonoscopy.    Elevated PSA     Glaucoma     HEARING LOSS     Hypertension     Kidney stone      Past Surgical History:   Procedure Laterality Date    COLONOSCOPY  3/17/2006  [facility]    Diverticulosis.  Anal papilla(e) were hypertrophied.     COLONOSCOPY  4/16/2012  [facility]    Diverticulosis.  Hypertrophied anal papilla.    CYSTOSCOPY      EYE SURGERY      bilateral PHACO with IOL    GLAUCOMA SURGERY      x 3/ one with cataract surgery     Family History   Problem  "Relation Age of Onset    Diabetes Mother     Diabetes Father     Heart disease Brother     Cancer Brother 66        pancreatic    Diabetes Sister      Social History     Tobacco Use    Smoking status: Never Smoker    Smokeless tobacco: Never Used   Substance Use Topics    Alcohol use: No    Drug use: No         OBJECTIVE:     Vital Signs (Most Recent)  Temp: 97.5 °F (36.4 °C) (09/26/19 0804)  Pulse: 86 (09/26/19 0804)  Resp: 15 (09/26/19 0804)  BP: 138/78 (09/26/19 0804)  SpO2: 97 % (09/26/19 0804)    Physical Exam:  :Ht 5' 11" (1.803 m)   Wt 90.7 kg (199 lb 15.3 oz)  BMI 27.89 kg/m²  GENERAL:  Comfortable, in no acute distress.                                 HEENT EXAM:  Nonicteric.  No adenopathy.  Oropharynx is clear.  NECK:  Supple.                                                               LUNGS:  Clear.                                                               CARDIAC:  Regular rate and rhythm.  S1, S2.  No murmur.            ABDOMEN:  Soft, positive bowel sounds, nontender.  No hepatosplenomegaly or masses.  No rebound or guarding.             EXTREMITIES:  No edema.     MENTAL STATUS:  Alert and oriented.    ASSESSMENT/PLAN:     Assessment: Colorectal cancer screening    Plan: Colonoscopy    Anesthesia Plan:   MAC / General Anaesthesia    ASA Grade: ASA 2 - Patient with mild systemic disease with no functional limitations    MALLAMPATI SCORE: I (soft palate, uvula, fauces, and tonsillar pillars visible)    "

## 2019-09-26 NOTE — BRIEF OP NOTE
Discharge Note  Short Stay      SUMMARY     Admit Date: 9/26/2019    Attending Physician: Gino Nance Jr., MD     Discharge Physician: Gino Nance Jr., MD    Discharge Date: 9/26/2019 9:16 AM    Final Diagnosis: Screening for colon cancer [Z12.11]  Impression:          - Diverticulosis in the sigmoid colon.                       - Non-bleeding internal hemorrhoids.                       - The examination was otherwise normal.                       - The examined portion of the ileum was normal.                       - No specimens collected.  Recommendation:      - Discharge patient to home.                       - High fiber diet.                       - Use fiber, for example Citrucel, Fibercon, Konsyl                        or Metamucil.                       - Take a PROBIOTIC, such as a carton of GREEK YOGURT                        (Chobani or Oikos, or Activia or Dannon); or tablets                        of ALIGN or CULTURELLE or KIM-Q (all                        non-prescription), every day for a month.                       - Repeat colonoscopy in 7 years for screening                        purposes.                       - Continue present medications.                       - Patient has a contact number available for                        emergencies. The signs and symptoms of potential                        delayed complications were discussed with the                        patient. Return to normal activities tomorrow.                        Written discharge instructions were provided to the                        patient.                       - Return to normal activities tomorrow.  Gino Nance MD  9/26/2019   Disposition: HOME OR SELF CARE    Patient Instructions:   Current Discharge Medication List      CONTINUE these medications which have NOT CHANGED    Details   bimatoprost (LUMIGAN) 0.03 % ophthalmic drops Place 1 drop into both eyes nightly. Every evening       cholecalciferol, vitamin D3, (VITAMIN D-3) 1,000 unit Chew Take 1 tablet by mouth once daily. Every day      coenzyme Q10 (CO Q-10) 100 mg capsule Take 1 capsule by mouth 2 (two) times daily. Every day      fish oil-omega-3 fatty acids 300-1,000 mg capsule Take 2 g by mouth once daily.      magnesium oxide-Mg AA chelate (MG-PLUS-PROTEIN) 133 mg Tab Take by mouth.      tamsulosin (FLOMAX) 0.4 mg Cap TAKE 2 CAPSULES NIGHTLY  Qty: 180 capsule, Refills: 3    Associated Diagnoses: Benign prostatic hyperplasia      verapamil (VERELAN) 180 MG C24P Take 1 capsule (180 mg total) by mouth once daily.  Qty: 90 capsule, Refills: 3    Associated Diagnoses: Essential hypertension      brimonidine (ALPHAGAN) 0.2 % ophthalmic solution Place 1 drop into both eyes 3 (three) times daily. Three times a day      dorzolamide-timolol (COSOPT) 2-0.5 % ophthalmic solution Place 1 drop into both eyes 2 (two) times daily. Three times a day      DUREZOL 0.05 % Drop ophthalmic solution       fluticasone (FLONASE) 50 mcg/actuation nasal spray 2 sprays (100 mcg total) by Each Nare route once daily.  Qty: 1 Bottle, Refills: 12      latanoprost 0.005 % ophthalmic solution       lutein 10 mg Tab Take by mouth.      pilocarpine (PILOCAR) 4 % ophthalmic solution Place 1 drop into both eyes 3 (three) times daily. Three times a day      pilocarpine HCL 2% (PILOCAR) 2 % ophthalmic solution              Discharge Procedure Orders (must include Diet, Follow-up, Activity)    Follow Up:  Follow up with PCP as per your routine.  Please follow a high fiber diet.  Activity as tolerated.    No driving day of procedure.    PROBIOTICS:  Now that your colon is so cleaned out, now is a good time for a round of PROBIOTICS.  Eat a container of Greek Yogurt, such as OIKOS or CHOBANI,  Or Activia or Dannon    Greek Yogurt.    Or Take a similar Probiotic product such as Align or Culturelle or Shala-Q, every day for a month.                  (The products listed are  non-prescription, but you may need to ask the pharmacist for their location.)  Repeat this at least 3-4 times a year.

## 2019-09-26 NOTE — TRANSFER OF CARE
"Anesthesia Transfer of Care Note    Patient: Jayro Mattson    Procedure(s) Performed: Procedure(s) (LRB):  COLONOSCOPY (N/A)    Patient location: GI    Anesthesia Type: general    Transport from OR: Transported from OR on 2-3 L/min O2 by NC with adequate spontaneous ventilation    Post pain: adequate analgesia    Post assessment: no apparent anesthetic complications    Post vital signs: stable    Level of consciousness: awake    Nausea/Vomiting: no nausea/vomiting    Complications: none    Transfer of care protocol was followed      Last vitals:   Visit Vitals  /68 (BP Location: Left arm, Patient Position: Lying)   Pulse 80   Temp 36.3 °C (97.4 °F) (Skin)   Resp 16   Ht 5' 9" (1.753 m)   Wt 90.7 kg (200 lb)   SpO2 97%   BMI 29.53 kg/m²     "

## 2019-09-26 NOTE — DISCHARGE INSTRUCTIONS
Recovery After Procedural Sedation (Adult)  You have been given medicine by vein to make you sleep during your surgery. This may have included both a pain medicine and sleeping medicine. Most of the effects have worn off. But you may still have some drowsiness for the next 6 to 8 hours.  Home care  Follow these guidelines when you get home:  · For the next 8 hours, you should be watched by a responsible adult. This person should make sure your condition is not getting worse.  · Don't drink any alcohol for the next 24 hours.  · Don't drive, operate dangerous machinery, or make important business or personal decisions during the next 24 hours.  Note: Your healthcare provider may tell you not to take any medicine by mouth for pain or sleep in the next 4 hours. These medicines may react with the medicines you were given in the hospital. This could cause a much stronger response than usual.  Follow-up care  Follow up with your healthcare provider if you are not alert and back to your usual level of activity within 12 hours.  When to seek medical advice  Call your healthcare provider right away if any of these occur:  · Drowsiness gets worse  · Weakness or dizziness gets worse  · Repeated vomiting  · You can't be awakened   Date Last Reviewed: 10/18/2016  © 7023-8037 [employer]. [address] [place] [address], [place] [address]. All rights reserved. This information is not intended as a substitute for professional medical care. Always follow your healthcare professional's instructions.      PROBIOTICS:  Now that your colon is so cleaned out, now is a good time for a round of PROBIOTICS.  Eat a container of Greek Yogurt, such as OIKOS or CHOBANI,  Or Activia or Dannon    Greek Yogurt.    Or Take a similar Probiotic product such as Align or Culturelle or Shala-Q, every day for a month.                  (The products listed are non-prescription, but you may need to ask the pharmacist for their location.)  Repeat  this at least 3-4 times a year.      High-Fiber Diet  Fiber is in fruits, vegetables, cereals, and grains. Fiber passes through your body undigested. A high-fiber diet helps food move through your intestinal tract. The added bulk is helpful in preventing constipation. In people with diverticulosis, fiber helps clean out the pouches along the colon wall. It also prevents new pouches from forming. A high-fiber diet reduces the risk of colon cancer. It also lowers blood cholesterol and prevents high blood sugar in people with diabetes.    The fiber-rich foods listed below should be part of your diet. If you are not used to high-fiber foods, start with 1 or 2 foods from this list. Every 3 to 4 days add a new one to your diet. Do this until you are eating 4 high-fiber foods per day. This should give you 20 to 35 grams of fiber a day. It is also important to drink a lot of water when you are on this diet. You should have 6 to 8 glasses of water a day. Water makes the fiber swell and increases the benefit.  Foods high in dietary fiber  The following foods are high in dietary fiber:  · Breads. Breads made with 100% whole-wheat flour; boom, wheat, or rye crackers; whole-grain tortillas, bran muffins.  · Cereals. Whole-grain and bran cereals with bran (shredded wheat, wheat flakes, raisin bran, corn bran); oatmeal, rolled oats, granola, and brown rice.  · Fruits. Fresh fruits and their edible skins (pears, prunes, raisins, berries, apples, and apricots); bananas, citrus fruit, mangoes, pineapple; and prune juice.  · Nuts. Any nuts and seeds.  · Vegetables. Best served raw or lightly cooked. All types, especially: green peas, celery, eggplant, potatoes, spinach, broccoli, [place]asley sprouts, winter squash, carrots, cauliflower, soybeans, lentils, and fresh and dried beans of all kinds.  · Other. Popcorn, any spices.  Date Last Reviewed: 8/1/2016  © 9550-0566 [employer]. [address] [place] [address], [place] [address].  All rights reserved. This information is not intended as a substitute for professional medical care. Always follow your healthcare professional's instructions.

## 2019-09-26 NOTE — [employer] PATIENT INSTRUCTIONS
Discharge Summary/Instructions for after Colonoscopy without   Biopsy/Polypectomy  Jayro Jansen    Thursday, September 26, 2019  Gino Nance MD  RESTRICTIONS ON ACTIVITY:  - Do not drive a car or operate machinery until the day after the procedure.      - The following day: return to full activity including work.  - For  3 days: No heavy lifting, straining or running.  - Diet: You may eat solid foods, but no gassy foods (i.e. beans, broccoli,   cabbage, etc).  TREATMENT FOR COMMON SIDE EFFECTS:  - Mild abdominal pain and bloating or excessive gas: rest, eat lightly and   use a heating pad.  SYMPTOMS TO WATCH FOR AND REPORT TO YOUR PHYSICIAN:  1. Severe abdominal pain.  2. Fever within 24 hours after a procedure.  3. A large amount of rectal bleeding. (A small amount of blood from the   rectum is not serious, especially if hemorrhoids are present.  3.  Because air was put into your colon during the procedure, expelling   large amounts of air from your rectum is normal.  4.  You may not have a bowel movement for 1-3 days because of the   colonoscopy prep.  This is normal.  5.  Call immediately if you notice any of the following:   Chills and/or fever over 101   Persistent vomiting   Severe abdominal pain, other than gas cramps   Severe chest pain   Black, tarry stools   Any bleeding - exceeding one tablespoon  Your doctor recommends these additional instructions:  Eat a high fiber diet.   Take a fiber supplement, for example Citrucel, Fibercon, Konsyl or   Metamucil.   Take a PROBIOTIC, such as a carton of GREEK YOGURT (Chobani or Oikos,  or   Activia or Dannon);  or tablets of ALIGN or CULTURELLE or KIM-Q (all   non-prescription), every day for a month.   And repeat this 3-4 times a   year.  Your physician has recommended a repeat colonoscopy in 7-8 years for   screening purposes.  None  If you have any questions or problems, please call your physician.  EMERGENCY PHONE NUMBER: [phone]  LAB  RESULTS: Call in two (2) weeks for lab results, [phone]  ___________________________________________  Nurse Signature  ___________________________________________  Patient/Designated Responsible Party Signature  Gino Nance MD  9/26/2019 9:13:58 AM  This report has been verified and signed electronically.  [employer]

## 2019-11-21 ENCOUNTER — OFFICE VISIT (OUTPATIENT)
Dept: FAMILY MEDICINE | Facility: CLINIC | Age: 74
End: 2019-11-21
Payer: MEDICARE

## 2019-11-21 VITALS
DIASTOLIC BLOOD PRESSURE: 72 MMHG | OXYGEN SATURATION: 97 % | WEIGHT: 198.88 LBS | RESPIRATION RATE: 17 BRPM | HEIGHT: 69 IN | HEART RATE: 84 BPM | SYSTOLIC BLOOD PRESSURE: 128 MMHG | BODY MASS INDEX: 29.46 KG/M2

## 2019-11-21 DIAGNOSIS — I10 ESSENTIAL HYPERTENSION: Primary | Chronic | ICD-10-CM

## 2019-11-21 DIAGNOSIS — R97.20 ELEVATED PSA: Chronic | ICD-10-CM

## 2019-11-21 DIAGNOSIS — Z12.11 COLON CANCER SCREENING: ICD-10-CM

## 2019-11-21 DIAGNOSIS — H40.9 GLAUCOMA, UNSPECIFIED GLAUCOMA TYPE, UNSPECIFIED LATERALITY: Chronic | ICD-10-CM

## 2019-11-21 PROCEDURE — 3074F PR MOST RECENT SYSTOLIC BLOOD PRESSURE < 130 MM HG: ICD-10-PCS | Mod: HCNC,CPTII,S$GLB, | Performed by: EMERGENCY MEDICINE

## 2019-11-21 PROCEDURE — 3078F DIAST BP <80 MM HG: CPT | Mod: HCNC,CPTII,S$GLB, | Performed by: EMERGENCY MEDICINE

## 2019-11-21 PROCEDURE — 3078F PR MOST RECENT DIASTOLIC BLOOD PRESSURE < 80 MM HG: ICD-10-PCS | Mod: HCNC,CPTII,S$GLB, | Performed by: EMERGENCY MEDICINE

## 2019-11-21 PROCEDURE — 1101F PT FALLS ASSESS-DOCD LE1/YR: CPT | Mod: HCNC,CPTII,S$GLB, | Performed by: EMERGENCY MEDICINE

## 2019-11-21 PROCEDURE — 99213 OFFICE O/P EST LOW 20 MIN: CPT | Mod: HCNC,S$GLB,, | Performed by: EMERGENCY MEDICINE

## 2019-11-21 PROCEDURE — 99213 PR OFFICE/OUTPT VISIT, EST, LEVL III, 20-29 MIN: ICD-10-PCS | Mod: HCNC,S$GLB,, | Performed by: EMERGENCY MEDICINE

## 2019-11-21 PROCEDURE — 1126F PR PAIN SEVERITY QUANTIFIED, NO PAIN PRESENT: ICD-10-PCS | Mod: HCNC,S$GLB,, | Performed by: EMERGENCY MEDICINE

## 2019-11-21 PROCEDURE — 1159F PR MEDICATION LIST DOCUMENTED IN MEDICAL RECORD: ICD-10-PCS | Mod: HCNC,S$GLB,, | Performed by: EMERGENCY MEDICINE

## 2019-11-21 PROCEDURE — 1159F MED LIST DOCD IN RCRD: CPT | Mod: HCNC,S$GLB,, | Performed by: EMERGENCY MEDICINE

## 2019-11-21 PROCEDURE — 3074F SYST BP LT 130 MM HG: CPT | Mod: HCNC,CPTII,S$GLB, | Performed by: EMERGENCY MEDICINE

## 2019-11-21 PROCEDURE — 1126F AMNT PAIN NOTED NONE PRSNT: CPT | Mod: HCNC,S$GLB,, | Performed by: EMERGENCY MEDICINE

## 2019-11-21 PROCEDURE — 99999 PR PBB SHADOW E&M-EST. PATIENT-LVL III: ICD-10-PCS | Mod: PBBFAC,HCNC,, | Performed by: EMERGENCY MEDICINE

## 2019-11-21 PROCEDURE — 1101F PR PT FALLS ASSESS DOC 0-1 FALLS W/OUT INJ PAST YR: ICD-10-PCS | Mod: HCNC,CPTII,S$GLB, | Performed by: EMERGENCY MEDICINE

## 2019-11-21 PROCEDURE — 99999 PR PBB SHADOW E&M-EST. PATIENT-LVL III: CPT | Mod: PBBFAC,HCNC,, | Performed by: EMERGENCY MEDICINE

## 2019-11-21 NOTE — PROGRESS NOTES
Subjective:   THIS NOTE IS DONE WITH VOICE RECOGNITION        Patient ID: Kelly Mattson is a 74 y.o. male.    Chief Complaint: Hypertension      HPI     Here to follow up on blood pressure.  Taking current medications.    BP Readings from Last 3 Encounters:   11/21/19 128/72   09/26/19 (!) 155/84   05/21/19 118/78     He continues to see Dr. Estrada for his glaucoma.  His pressures are doing well.  He has been able to stop one of his medications.     No new urinary symptoms.    Results for KELLY MATTSON (MRN [id number]) as of 11/21/2019 15:29   Ref. Range 4/23/2015 07:40 10/29/2015 10:21 10/18/2016 12:28 8/7/2018 07:49 5/21/2019 15:41   PSA DIAGNOSTIC Latest Ref Range: 0.00 - 4.00 ng/mL 7.0 (H) 6.0 (H) 6.8 (H) 6.0 (H) 6.1 (H)     Night time lower leg cramps.  Immunization History   Administered Date(s) Administered    Influenza - High Dose - PF (65 years and older) 11/14/2014, 10/18/2016, 11/20/2018, 09/12/2019    Influenza Split 02/10/2012    Pneumococcal Conjugate - 13 Valent 10/18/2016    Pneumococcal Polysaccharide - 23 Valent 05/06/2014     Shingrix (recombinant shingles vaccine) has been discussed and recommended.      Current Outpatient Medications   Medication Sig Dispense Refill    bimatoprost (LUMIGAN) 0.03 % ophthalmic drops Place 1 drop into both eyes nightly. Every evening      brimonidine (ALPHAGAN) 0.2 % ophthalmic solution Place 1 drop into both eyes 3 (three) times daily. Three times a day      cholecalciferol, vitamin D3, (VITAMIN D-3) 1,000 unit Chew Take 1 tablet by mouth once daily. Every day      coenzyme Q10 (CO Q-10) 100 mg capsule Take 1 capsule by mouth 2 (two) times daily. Every day      dorzolamide-timolol (COSOPT) 2-0.5 % ophthalmic solution Place 1 drop into both eyes 2 (two) times daily. Three times a day      DUREZOL 0.05 % Drop ophthalmic solution       fish oil-omega-3 fatty acids 300-1,000 mg capsule Take 2 g by mouth once daily.      fluticasone (FLONASE) 50  mcg/actuation nasal spray 2 sprays (100 mcg total) by Each Nare route once daily. 1 Bottle 12    latanoprost 0.005 % ophthalmic solution       lutein 10 mg Tab Take by mouth.      magnesium oxide-Mg AA chelate (MG-PLUS-PROTEIN) 133 mg Tab Take by mouth.      tamsulosin (FLOMAX) 0.4 mg Cap TAKE 2 CAPSULES NIGHTLY 180 capsule 3    verapamil (VERELAN) 180 MG C24P Take 1 capsule (180 mg total) by mouth once daily. 90 capsule 3    pilocarpine (PILOCAR) 4 % ophthalmic solution Place 1 drop into both eyes 3 (three) times daily. Three times a day      pilocarpine HCL 2% (PILOCAR) 2 % ophthalmic solution        No current facility-administered medications for this visit.          Review of Systems   Constitutional: Negative for activity change, chills, fever and unexpected weight change.   HENT: Negative for hearing loss, nosebleeds and tinnitus.    Eyes: Positive for visual disturbance (Stable). Negative for discharge and itching.   Respiratory: Negative for cough, choking, chest tightness, shortness of breath and wheezing.    Cardiovascular: Negative for chest pain, palpitations and leg swelling.   Gastrointestinal: Negative for abdominal distention.   Genitourinary: Negative for difficulty urinating, dysuria, flank pain, hematuria and urgency.   Musculoskeletal: Negative for arthralgias, back pain and joint swelling.   Neurological: Negative for dizziness, tremors, seizures, numbness and headaches.   Psychiatric/Behavioral: Negative for confusion, dysphoric mood and hallucinations.       Objective:      Physical Exam   Constitutional: He is oriented to person, place, and time. He appears well-developed and well-nourished. No distress.   HENT:   Head: Normocephalic and atraumatic.   Right Ear: External ear normal.   Left Ear: External ear normal.   Nose: Nose normal.   Mouth/Throat: Oropharynx is clear and moist. No oropharyngeal exudate.   Eyes: Pupils are equal, round, and reactive to light. EOM are normal. Right  conjunctiva is injected. Left conjunctiva is not injected. No scleral icterus.   Neck: Normal range of motion. Neck supple. No thyromegaly present.   Cardiovascular: Normal rate, regular rhythm and normal heart sounds.   Pulmonary/Chest: Effort normal and breath sounds normal. He has no wheezes. He has no rales.   Abdominal: Soft. Bowel sounds are normal. He exhibits no distension. There is no tenderness.   Musculoskeletal: Normal range of motion. He exhibits no edema or tenderness.   Lymphadenopathy:     He has no cervical adenopathy.   Neurological: He is alert and oriented to person, place, and time. He has normal reflexes. He exhibits normal muscle tone. Coordination normal.   Skin: Skin is warm and dry. No rash noted.   Psychiatric: He has a normal mood and affect. His behavior is normal.   Vitals reviewed.      Assessment:       1. Essential hypertension    2. Glaucoma, unspecified glaucoma type, unspecified laterality    3. Colon cancer screening    4. Elevated PSA        Plan:     1. Essential hypertension  Controlled.  Continue current medications  Continue to avoid excessive sodium  Continue home monitoring  Target blood pressure is 139/89 or less    - Comprehensive metabolic panel; Future    2. Glaucoma, unspecified glaucoma type, unspecified laterality  Stable  Followed by Ophthalmology  No changes    3. Colon cancer screening  Current.    4. Elevated PSA  Stable  Monitoring of PSA recommended    - PROSTATE SPECIFIC ANTIGEN, DIAGNOSTIC; Future    He is considering hearing aids.  A copy of his most recent audiogram was given to him.

## 2019-11-21 NOTE — PATIENT INSTRUCTIONS
Jayro,    Your immunizations are current.  There is a new shingles vaccine call Shingrix.  If your insurance pays for this, I would recommend getting this vaccine.  It is an improvement over the older vaccine.    You do have a new copy of your last hearing test.  The audiologist did recommend hearing aids.    Your blood pressure is excellent.  No changes.    Franko Salas MD

## 2019-12-02 ENCOUNTER — TELEPHONE (OUTPATIENT)
Dept: AUDIOLOGY | Facility: CLINIC | Age: 74
End: 2019-12-02

## 2019-12-02 NOTE — TELEPHONE ENCOUNTER
Called back - no answer. LMOV for patient's son to call back at his convenience and left direct phone number for Audiology department.

## 2019-12-02 NOTE — TELEPHONE ENCOUNTER
----- Message from Grecia Alicea sent at 11/29/2019  1:13 PM CST -----  Contact: patient son ramesh brownee ph# [phone]   patient son ramesh good ph# [phone]   Please call concerning audiology  report and inquiring about purchasing hearing device

## 2019-12-04 NOTE — TELEPHONE ENCOUNTER
Spoke with patient's son and answered his questions regarding hearing aids and insurance policies for hearing aid claims with [facility]. He is aware that [facility] does not file any hearing aid claims but that patients are able to do so directly with the insurance provider.  He will discuss this information with his father and call back if he wants to proceed with getting an updated audiogram with hearing aid consultation.

## 2019-12-31 DIAGNOSIS — I10 ESSENTIAL HYPERTENSION: ICD-10-CM

## 2019-12-31 RX ORDER — VERAPAMIL HYDROCHLORIDE 180 MG/1
180 CAPSULE, EXTENDED RELEASE ORAL DAILY
Qty: 90 CAPSULE | Refills: 3 | Status: SHIPPED | OUTPATIENT
Start: 2019-12-31 | End: 2020-04-06 | Stop reason: SDUPTHER

## 2019-12-31 NOTE — TELEPHONE ENCOUNTER
----- Message from Gosia Hernandez sent at 12/31/2019  9:18 AM CST -----  Contact: self  Type:  RX Refill Request    Who Called:  Patient  Refill or New Rx:  refill  RX Name and Strength:  verapamil (VERELAN) 180 MG C24P  How is the patient currently taking it? (ex. 1XDay):  1XDay  Is this a 30 day or 90 day RX:  10 day supply  Preferred Pharmacy with phone number:    [facility]/pharmacy #[id number] - [place] - [address] AT [address] [address]  Phone: [phone] Fax: [phone]  Local or Mail Order:  local  Ordering Provider:  Dr Josue Cason Call Back Number:  [phone] (home) [phone] (work)  Additional Information:  He is out of his meds and just needs a 10 day supply until he gets his mail order supply from [facility], they messed up his order.  Please call back after sending he has been out for a couple of days now

## 2020-01-17 ENCOUNTER — TELEPHONE (OUTPATIENT)
Dept: FAMILY MEDICINE | Facility: CLINIC | Age: 75
End: 2020-01-17

## 2020-01-20 ENCOUNTER — TELEPHONE (OUTPATIENT)
Dept: FAMILY MEDICINE | Facility: CLINIC | Age: 75
End: 2020-01-20

## 2020-01-20 NOTE — TELEPHONE ENCOUNTER
----- Message from Erin Bangura sent at 1/20/2020  8:43 AM CST -----  Contact: Patient  Type: Needs Medical Advice    Who Called:  Patient  Best Call Back Number: [phone] (home) [phone] (work)  Additional Information: The patient would like a call back to make an  appt he is returning Cata's call the MA

## 2020-01-29 ENCOUNTER — OFFICE VISIT (OUTPATIENT)
Dept: FAMILY MEDICINE | Facility: CLINIC | Age: 75
End: 2020-01-29
Payer: MEDICARE

## 2020-01-29 VITALS
HEART RATE: 66 BPM | SYSTOLIC BLOOD PRESSURE: 138 MMHG | HEIGHT: 69 IN | OXYGEN SATURATION: 99 % | DIASTOLIC BLOOD PRESSURE: 82 MMHG | WEIGHT: 200.19 LBS | BODY MASS INDEX: 29.65 KG/M2

## 2020-01-29 DIAGNOSIS — Z00.00 ENCOUNTER FOR PREVENTIVE HEALTH EXAMINATION: Primary | ICD-10-CM

## 2020-01-29 DIAGNOSIS — I10 ESSENTIAL HYPERTENSION: Chronic | ICD-10-CM

## 2020-01-29 PROCEDURE — G0439 PPPS, SUBSEQ VISIT: HCPCS | Mod: HCNC,S$GLB,, | Performed by: NURSE PRACTITIONER

## 2020-01-29 PROCEDURE — 3075F PR MOST RECENT SYSTOLIC BLOOD PRESS GE 130-139MM HG: ICD-10-PCS | Mod: HCNC,CPTII,S$GLB, | Performed by: NURSE PRACTITIONER

## 2020-01-29 PROCEDURE — 3079F PR MOST RECENT DIASTOLIC BLOOD PRESSURE 80-89 MM HG: ICD-10-PCS | Mod: HCNC,CPTII,S$GLB, | Performed by: NURSE PRACTITIONER

## 2020-01-29 PROCEDURE — 99999 PR PBB SHADOW E&M-EST. PATIENT-LVL IV: ICD-10-PCS | Mod: PBBFAC,HCNC,, | Performed by: NURSE PRACTITIONER

## 2020-01-29 PROCEDURE — 3079F DIAST BP 80-89 MM HG: CPT | Mod: HCNC,CPTII,S$GLB, | Performed by: NURSE PRACTITIONER

## 2020-01-29 PROCEDURE — 3075F SYST BP GE 130 - 139MM HG: CPT | Mod: HCNC,CPTII,S$GLB, | Performed by: NURSE PRACTITIONER

## 2020-01-29 PROCEDURE — 99999 PR PBB SHADOW E&M-EST. PATIENT-LVL IV: CPT | Mod: PBBFAC,HCNC,, | Performed by: NURSE PRACTITIONER

## 2020-01-29 PROCEDURE — G0439 PR MEDICARE ANNUAL WELLNESS SUBSEQUENT VISIT: ICD-10-PCS | Mod: HCNC,S$GLB,, | Performed by: NURSE PRACTITIONER

## 2020-01-29 NOTE — PATIENT INSTRUCTIONS
Counseling and Referral of Other Preventative  (Italic type indicates deductible and co-insurance are waived)    Patient Name: Jayro Mattson  Today's Date: 1/29/2020    Health Maintenance       Date Due Completion Date    TETANUS VACCINE 08/10/1963 ---    Shingles Vaccine (2 of 3) 01/09/2015 11/14/2014    Lipid Panel 08/07/2023 8/7/2018    Colonoscopy 09/26/2029 9/26/2019        No orders of the defined types were placed in this encounter.    The following information is provided to all patients.  This information is to help you find resources for any of the problems found today that may be affecting your health:                Living healthy guide: www.[facility].[place].[facility]      Understanding Diabetes: www.diabetes.org      Eating healthy: www.cdc.gov/healthyweight      CDC home safety checklist: www.cdc.gov/steadi/patient.html      Agency on Aging: www.goea.[place].[facility]      Alcoholics anonymous (AA): www.aa.org      Physical Activity: www.riki.nih.gov/pp0oubk      Tobacco use: www.quitwithusla.org

## 2020-01-29 NOTE — PROGRESS NOTES
"Jayro Mattson presented for a  Medicare AWV and comprehensive Health Risk Assessment today. The following components were reviewed and updated:    · Medical history  · Family History  · Social history  · Allergies and Current Medications  · Health Risk Assessment  · Health Maintenance  · Care Team     ** See Completed Assessments for Annual Wellness Visit within the encounter summary.**       The following assessments were completed:  · Living Situation  · CAGE  · Depression Screening  · Timed Get Up and Go  · Whisper Test  · Cognitive Function Screening      · Nutrition Screening  · ADL Screening  · PAQ Screening    Vitals:    01/29/20 1518   BP: 138/82   BP Location: Left arm   Patient Position: Sitting   BP Method: Medium (Manual)   Pulse: 66   SpO2: 99%   Weight: 90.8 kg (200 lb 2.8 oz)   Height: 5' 9" (1.753 m)     Body mass index is 29.56 kg/m².  Physical Exam   Constitutional: He appears well-developed. No distress.   HENT:   Head: Normocephalic.   Cardiovascular: Normal rate and regular rhythm.   No murmur heard.  Pulmonary/Chest: Effort normal and breath sounds normal. He has no wheezes.   Neurological: He is alert.   Skin: Skin is warm.   Psychiatric: He has a normal mood and affect. His behavior is normal.   Vitals reviewed.        Diagnoses and health risks identified today and associated recommendations/orders:    1. Encounter for preventive health examination  Reviewed health maintenance and provided recommendations    Discussed tdap and shingrix vaccines, declines at this time    2. Essential hypertension  Continue to monitor  Followed by Franko Salas Jr, MD .        Provided Jayro with a 5-10 year written screening schedule and personal prevention plan. Recommendations were developed using the USPSTF age appropriate recommendations. Education, counseling, and referrals were provided as needed. After Visit Summary printed and given to patient which includes a list of additional screenings\tests " needed.    Follow up in about 1 year (around 1/29/2021).    Mely Triana NP  I offered to discuss end of life issues, including information on how to make advance directives that the patient could use to name someone who would make medical decisions on their behalf if they became too ill to make themselves.    ___Patient declined  _X_Patient is interested, I provided paper work and offered to discuss.

## 2020-04-06 DIAGNOSIS — I10 ESSENTIAL HYPERTENSION: ICD-10-CM

## 2020-04-06 DIAGNOSIS — N40.0 BENIGN PROSTATIC HYPERPLASIA: ICD-10-CM

## 2020-04-06 NOTE — TELEPHONE ENCOUNTER
----- Message from Princess MAURILIO Caceres sent at 4/6/2020  2:15 PM CDT -----  Contact: pt  Type:  RX Refill Request    Who Called:  Patient  Refill or New Rx:  refill  RX Name and Strength:  verapamil (VERELAN) 180 MG C24P and tamsulosin (FLOMAX) 0.4 mg Cap  Preferred Pharmacy with phone number:    [facility] - [place], [place] - [address]  [address] [address]  Phone: [phone] Fax: [phone]  Local or Mail Order:  Mail  Ordering Provider:  Dr. Josue Cason Call Back Number:  [phone] or   Additional Information:

## 2020-04-07 NOTE — TELEPHONE ENCOUNTER
----- Message from Shaye Stevens sent at 4/7/2020 10:59 AM CDT -----  Contact: Jayro buckner  Type:  RX Refill Request    Who Called:  Jayro  Refill or New Rx:  refill  RX Name and Strength:  tamsulosin (FLOMAX) 0.4 mg Cap   verapamil (VERELAN) 180 MG C24P       How is the patient currently taking it? (ex. 1XDay):  As directed  Is this a 30 day or 90 day RX:  90 day  Preferred Pharmacy with phone number:    [facility]/pharmacy #[id number] - [place] - [address] AT [facility]  [address] [place] [address]  Phone: [phone] Fax: [phone]    [facility]/pharmacy #[id number] - [place] - [address]  [address]  [facility] [address]  Phone: [phone] Fax: [phone]    [facility] - [facility] [address]  [address]  [facility] [address]  Phone: [phone] Fax: [phone]      Local or Mail Order:  Mail order  Ordering Provider:  Josue Cason Call Back Number:  [phone]  Additional Information: Pls call pt regarding his refills

## 2020-04-09 RX ORDER — TAMSULOSIN HYDROCHLORIDE 0.4 MG/1
CAPSULE ORAL
Qty: 180 CAPSULE | Refills: 2 | Status: SHIPPED | OUTPATIENT
Start: 2020-04-09 | End: 2020-10-08 | Stop reason: SDUPTHER

## 2020-04-09 RX ORDER — VERAPAMIL HYDROCHLORIDE 180 MG/1
180 CAPSULE, EXTENDED RELEASE ORAL DAILY
Qty: 90 CAPSULE | Refills: 2 | Status: SHIPPED | OUTPATIENT
Start: 2020-04-09 | End: 2020-04-13 | Stop reason: SDUPTHER

## 2020-04-10 NOTE — PROGRESS NOTES
Refill Authorization Note     is requesting a refill authorization.    Brief assessment and rationale for refill: APPROVE: prr                                         Comments:   Refill Center Care Gap Closure protocols temporarily suspended.   Requested Prescriptions   Signed Prescriptions Disp Refills    verapamiL (VERELAN) 180 MG C24P 90 capsule 2     Sig: Take 1 capsule (180 mg total) by mouth once daily.       Cardiovascular:  Calcium Channel Blockers - diltiazem & verapamil Passed - 4/7/2020 11:04 AM        Passed - Patient is at least 18 years old        Passed - Last BP in normal range within 360 days.     BP Readings from Last 3 Encounters:   01/29/20 138/82   11/21/19 128/72   09/26/19 (!) 155/84              Passed - Last Heart Rate in normal range within 360 days.     Pulse Readings from Last 3 Encounters:   01/29/20 66   11/21/19 84   09/26/19 83             Passed - Office visit in past 12 months or future 90 days.     Recent Outpatient Visits            2 months ago Encounter for preventive health examination    [facility] Mely Triana NP    4 months ago Essential hypertension    [facility] Franko Salas Jr., MD    10 months ago Essential hypertension    [facility] Franko Salas Jr., MD    1 year ago Encounter for preventive health examination    [facility] Mely Triana NP    1 year ago Dizziness and giddiness    [facility] ENT Han Monroy MD                   tamsulosin (FLOMAX) 0.4 mg Cap 180 capsule 2     Sig: TAKE 2 CAPSULES NIGHTLY       Urology: Alpha-Adrenergic Blocker Passed - 4/7/2020 11:04 AM        Passed - Patient is at least 18 years old        Passed - Last BP in normal range within 360 days.     BP Readings from Last 3 Encounters:   01/29/20 138/82   11/21/19 128/72   09/26/19 (!) 155/84              Passed - Office visit in past 12 months or future 90 days.     Recent Outpatient  Visits            2 months ago Encounter for preventive health examination    [facility] Mely Triana NP    4 months ago Essential hypertension    [facility] Franko Salas Jr., MD    10 months ago Essential hypertension    [facility] Franko Salas Jr., MD    1 year ago Encounter for preventive health examination    [facility] Mely Triana NP    1 year ago Dizziness and giddiness    [facility] ENT Han Monroy MD                    Passed - Prostate Cancer is not on problem list         Appointments  past 12m or future 3m with PCP    Date Provider   Last Visit   11/21/2019 Franko Salas Jr., MD   Next Visit   Visit date not found Franko Salas Jr., MD   .  ED visits in past 90 days: 0       Note composed:8:33 PM 04/09/2020

## 2020-04-13 DIAGNOSIS — I10 ESSENTIAL HYPERTENSION: ICD-10-CM

## 2020-04-13 DIAGNOSIS — N40.0 BENIGN PROSTATIC HYPERPLASIA: ICD-10-CM

## 2020-04-13 RX ORDER — VERAPAMIL HYDROCHLORIDE 180 MG/1
180 CAPSULE, EXTENDED RELEASE ORAL DAILY
Qty: 14 CAPSULE | Refills: 0 | Status: SHIPPED | OUTPATIENT
Start: 2020-04-13 | End: 2020-10-23

## 2020-04-13 NOTE — TELEPHONE ENCOUNTER
----- Message from Vanessa Martinez sent at 4/13/2020  8:17 AM CDT -----  Contact: Pt  Type: Needs Medical Advice  Who Called: Pt  Symptoms (please be specific):    How long has patient had these symptoms:    Pharmacy name and phone #:    Best Call Back Number:   Additional Information: Pt requesting at all of his medication be sent to [facility] pharmacy mail delivery.

## 2020-04-13 NOTE — TELEPHONE ENCOUNTER
I have called his meds to human as they went to [facility].   He would like to get a few at our [facility] pharmacy for his blood pressure med as he is out. Of them. Thanks

## 2020-05-06 ENCOUNTER — PATIENT MESSAGE (OUTPATIENT)
Dept: ADMINISTRATIVE | Facility: HOSPITAL | Age: 75
End: 2020-05-06

## 2020-07-28 ENCOUNTER — LAB VISIT (OUTPATIENT)
Dept: LAB | Facility: HOSPITAL | Age: 75
End: 2020-07-28
Attending: NURSE PRACTITIONER
Payer: MEDICARE

## 2020-07-28 ENCOUNTER — OFFICE VISIT (OUTPATIENT)
Dept: FAMILY MEDICINE | Facility: CLINIC | Age: 75
End: 2020-07-28
Payer: MEDICARE

## 2020-07-28 VITALS
HEART RATE: 68 BPM | BODY MASS INDEX: 28.28 KG/M2 | DIASTOLIC BLOOD PRESSURE: 78 MMHG | WEIGHT: 190.94 LBS | SYSTOLIC BLOOD PRESSURE: 134 MMHG | HEIGHT: 69 IN | TEMPERATURE: 98 F | OXYGEN SATURATION: 96 %

## 2020-07-28 DIAGNOSIS — Z01.818 PRE-OP EXAM: ICD-10-CM

## 2020-07-28 DIAGNOSIS — K59.00 CONSTIPATION, UNSPECIFIED CONSTIPATION TYPE: Primary | ICD-10-CM

## 2020-07-28 DIAGNOSIS — Z12.5 PROSTATE CANCER SCREENING: ICD-10-CM

## 2020-07-28 DIAGNOSIS — K59.00 CONSTIPATION, UNSPECIFIED CONSTIPATION TYPE: ICD-10-CM

## 2020-07-28 DIAGNOSIS — I10 ESSENTIAL HYPERTENSION: Chronic | ICD-10-CM

## 2020-07-28 LAB
ALBUMIN SERPL BCP-MCNC: 3.9 G/DL (ref 3.5–5.2)
ALP SERPL-CCNC: 73 U/L (ref 55–135)
ALT SERPL W/O P-5'-P-CCNC: 12 U/L (ref 10–44)
AMYLASE SERPL-CCNC: 125 U/L (ref 20–110)
ANION GAP SERPL CALC-SCNC: 7 MMOL/L (ref 8–16)
AST SERPL-CCNC: 19 U/L (ref 10–40)
BASOPHILS # BLD AUTO: 0.03 K/UL (ref 0–0.2)
BASOPHILS NFR BLD: 0.8 % (ref 0–1.9)
BILIRUB SERPL-MCNC: 0.4 MG/DL (ref 0.1–1)
BILIRUB UR QL STRIP: NEGATIVE
BUN SERPL-MCNC: 22 MG/DL (ref 8–23)
CALCIUM SERPL-MCNC: 9.1 MG/DL (ref 8.7–10.5)
CHLORIDE SERPL-SCNC: 109 MMOL/L (ref 95–110)
CHOLEST SERPL-MCNC: 185 MG/DL (ref 120–199)
CHOLEST/HDLC SERPL: 3.4 {RATIO} (ref 2–5)
CLARITY UR: CLEAR
CO2 SERPL-SCNC: 26 MMOL/L (ref 23–29)
COLOR UR: YELLOW
COMPLEXED PSA SERPL-MCNC: 6.5 NG/ML (ref 0–4)
CREAT SERPL-MCNC: 1.1 MG/DL (ref 0.5–1.4)
DIFFERENTIAL METHOD: ABNORMAL
EOSINOPHIL # BLD AUTO: 0.1 K/UL (ref 0–0.5)
EOSINOPHIL NFR BLD: 3.4 % (ref 0–8)
ERYTHROCYTE [DISTWIDTH] IN BLOOD BY AUTOMATED COUNT: 13.4 % (ref 11.5–14.5)
EST. GFR  (AFRICAN AMERICAN): >60 ML/MIN/1.73 M^2
EST. GFR  (NON AFRICAN AMERICAN): >60 ML/MIN/1.73 M^2
GLUCOSE SERPL-MCNC: 87 MG/DL (ref 70–110)
GLUCOSE UR QL STRIP: NEGATIVE
HCT VFR BLD AUTO: 40.8 % (ref 40–54)
HDLC SERPL-MCNC: 54 MG/DL (ref 40–75)
HDLC SERPL: 29.2 % (ref 20–50)
HGB BLD-MCNC: 12.8 G/DL (ref 14–18)
HGB UR QL STRIP: NEGATIVE
IMM GRANULOCYTES # BLD AUTO: 0.01 K/UL (ref 0–0.04)
IMM GRANULOCYTES NFR BLD AUTO: 0.3 % (ref 0–0.5)
KETONES UR QL STRIP: NEGATIVE
LDLC SERPL CALC-MCNC: 118.4 MG/DL (ref 63–159)
LEUKOCYTE ESTERASE UR QL STRIP: NEGATIVE
LIPASE SERPL-CCNC: 21 U/L (ref 4–60)
LYMPHOCYTES # BLD AUTO: 1.2 K/UL (ref 1–4.8)
LYMPHOCYTES NFR BLD: 30.7 % (ref 18–48)
MCH RBC QN AUTO: 29.9 PG (ref 27–31)
MCHC RBC AUTO-ENTMCNC: 31.4 G/DL (ref 32–36)
MCV RBC AUTO: 95 FL (ref 82–98)
MONOCYTES # BLD AUTO: 0.4 K/UL (ref 0.3–1)
MONOCYTES NFR BLD: 9 % (ref 4–15)
NEUTROPHILS # BLD AUTO: 2.2 K/UL (ref 1.8–7.7)
NEUTROPHILS NFR BLD: 55.8 % (ref 38–73)
NITRITE UR QL STRIP: NEGATIVE
NONHDLC SERPL-MCNC: 131 MG/DL
NRBC BLD-RTO: 0 /100 WBC
PH UR STRIP: 6 [PH] (ref 5–8)
PLATELET # BLD AUTO: 165 K/UL (ref 150–350)
PMV BLD AUTO: 12.3 FL (ref 9.2–12.9)
POTASSIUM SERPL-SCNC: 4.4 MMOL/L (ref 3.5–5.1)
PROT SERPL-MCNC: 7.5 G/DL (ref 6–8.4)
PROT UR QL STRIP: NEGATIVE
RBC # BLD AUTO: 4.28 M/UL (ref 4.6–6.2)
SODIUM SERPL-SCNC: 142 MMOL/L (ref 136–145)
SP GR UR STRIP: 1.02 (ref 1–1.03)
TRIGL SERPL-MCNC: 63 MG/DL (ref 30–150)
TSH SERPL DL<=0.005 MIU/L-ACNC: 1.68 UIU/ML (ref 0.4–4)
URN SPEC COLLECT METH UR: NORMAL
WBC # BLD AUTO: 3.87 K/UL (ref 3.9–12.7)

## 2020-07-28 PROCEDURE — 99999 PR PBB SHADOW E&M-EST. PATIENT-LVL IV: ICD-10-PCS | Mod: PBBFAC,HCNC,, | Performed by: NURSE PRACTITIONER

## 2020-07-28 PROCEDURE — 1159F MED LIST DOCD IN RCRD: CPT | Mod: HCNC,S$GLB,, | Performed by: NURSE PRACTITIONER

## 2020-07-28 PROCEDURE — 1101F PT FALLS ASSESS-DOCD LE1/YR: CPT | Mod: HCNC,CPTII,S$GLB, | Performed by: NURSE PRACTITIONER

## 2020-07-28 PROCEDURE — 84443 ASSAY THYROID STIM HORMONE: CPT | Mod: HCNC

## 2020-07-28 PROCEDURE — 80053 COMPREHEN METABOLIC PANEL: CPT | Mod: HCNC

## 2020-07-28 PROCEDURE — 99214 OFFICE O/P EST MOD 30 MIN: CPT | Mod: HCNC,S$GLB,, | Performed by: NURSE PRACTITIONER

## 2020-07-28 PROCEDURE — 81003 URINALYSIS AUTO W/O SCOPE: CPT | Mod: HCNC,PO

## 2020-07-28 PROCEDURE — 99999 PR PBB SHADOW E&M-EST. PATIENT-LVL IV: CPT | Mod: PBBFAC,HCNC,, | Performed by: NURSE PRACTITIONER

## 2020-07-28 PROCEDURE — 1101F PR PT FALLS ASSESS DOC 0-1 FALLS W/OUT INJ PAST YR: ICD-10-PCS | Mod: HCNC,CPTII,S$GLB, | Performed by: NURSE PRACTITIONER

## 2020-07-28 PROCEDURE — 3078F DIAST BP <80 MM HG: CPT | Mod: HCNC,CPTII,S$GLB, | Performed by: NURSE PRACTITIONER

## 2020-07-28 PROCEDURE — 3008F PR BODY MASS INDEX (BMI) DOCUMENTED: ICD-10-PCS | Mod: HCNC,CPTII,S$GLB, | Performed by: NURSE PRACTITIONER

## 2020-07-28 PROCEDURE — 83690 ASSAY OF LIPASE: CPT | Mod: HCNC

## 2020-07-28 PROCEDURE — 1159F PR MEDICATION LIST DOCUMENTED IN MEDICAL RECORD: ICD-10-PCS | Mod: HCNC,S$GLB,, | Performed by: NURSE PRACTITIONER

## 2020-07-28 PROCEDURE — 80061 LIPID PANEL: CPT | Mod: HCNC

## 2020-07-28 PROCEDURE — 99214 PR OFFICE/OUTPT VISIT, EST, LEVL IV, 30-39 MIN: ICD-10-PCS | Mod: HCNC,S$GLB,, | Performed by: NURSE PRACTITIONER

## 2020-07-28 PROCEDURE — 1126F AMNT PAIN NOTED NONE PRSNT: CPT | Mod: HCNC,S$GLB,, | Performed by: NURSE PRACTITIONER

## 2020-07-28 PROCEDURE — 36415 COLL VENOUS BLD VENIPUNCTURE: CPT | Mod: HCNC,PO

## 2020-07-28 PROCEDURE — 84153 ASSAY OF PSA TOTAL: CPT | Mod: HCNC

## 2020-07-28 PROCEDURE — 82272 OCCULT BLD FECES 1-3 TESTS: CPT | Mod: HCNC

## 2020-07-28 PROCEDURE — 85025 COMPLETE CBC W/AUTO DIFF WBC: CPT | Mod: HCNC

## 2020-07-28 PROCEDURE — 1126F PR PAIN SEVERITY QUANTIFIED, NO PAIN PRESENT: ICD-10-PCS | Mod: HCNC,S$GLB,, | Performed by: NURSE PRACTITIONER

## 2020-07-28 PROCEDURE — 3078F PR MOST RECENT DIASTOLIC BLOOD PRESSURE < 80 MM HG: ICD-10-PCS | Mod: HCNC,CPTII,S$GLB, | Performed by: NURSE PRACTITIONER

## 2020-07-28 PROCEDURE — 3075F SYST BP GE 130 - 139MM HG: CPT | Mod: HCNC,CPTII,S$GLB, | Performed by: NURSE PRACTITIONER

## 2020-07-28 PROCEDURE — 3008F BODY MASS INDEX DOCD: CPT | Mod: HCNC,CPTII,S$GLB, | Performed by: NURSE PRACTITIONER

## 2020-07-28 PROCEDURE — 82150 ASSAY OF AMYLASE: CPT | Mod: HCNC

## 2020-07-28 PROCEDURE — 3075F PR MOST RECENT SYSTOLIC BLOOD PRESS GE 130-139MM HG: ICD-10-PCS | Mod: HCNC,CPTII,S$GLB, | Performed by: NURSE PRACTITIONER

## 2020-07-28 RX ORDER — PILOCARPINE HYDROCHLORIDE 20 MG/ML
SOLUTION/ DROPS OPHTHALMIC
COMMUNITY
Start: 2020-06-11 | End: 2023-05-18

## 2020-07-28 NOTE — PROGRESS NOTES
Subjective:       Patient ID: Jayro Mattson is a 74 y.o. male.    Chief Complaint: Pre-op Exam    Patient is having a laser eye surgery on 8/11/2020 by Dr Estrada in [place]. Here for pre operative clearance.   Only recent problem he has had has been constipation for a couple of months, he has been taking OTC stool softeners.   HTN stable, compliant with medications, due for labs.  BPH controlled with flomax, due for psa.   Past Medical History:  No date: Allergy  No date: BPH (benign prostatic hyperplasia)  No date: Diverticulitis  3/17/2006: Diverticulosis      Comment:  Seen at colonoscopy.  No date: Elevated PSA  No date: Glaucoma  No date: HEARING LOSS  No date: Hypertension  No date: Kidney stone    Past Surgical History:  3/17/2006  [facility]: COLONOSCOPY      Comment:  Diverticulosis.  Anal papilla(e) were hypertrophied.   4/16/2012  [facility]: COLONOSCOPY      Comment:  Diverticulosis.  Hypertrophied anal papilla.  9/26/2019: COLONOSCOPY; N/A      Comment:  Procedure: COLONOSCOPY;  Surgeon: Gino Nance Jr., MD;  Location: [facility];  Service: Endoscopy;                 Laterality: N/A;  No date: CYSTOSCOPY  No date: EYE SURGERY      Comment:  bilateral PHACO with IOL  No date: GLAUCOMA SURGERY      Comment:  x 3/ one with cataract surgery    Review of patient's family history indicates:  Problem: Diabetes      Relation: Mother          Age of Onset: (Not Specified)  Problem: Diabetes      Relation: Father          Age of Onset: (Not Specified)  Problem: Heart disease      Relation: Brother          Age of Onset: (Not Specified)  Problem: Cancer      Relation: Brother          Age of Onset: 66          Comment: pancreatic  Problem: Diabetes      Relation: Sister          Age of Onset: (Not Specified)      Social History    Socioeconomic History      Marital status:       Spouse name: Not on file      Number of children: 3      Years of education: Not on file      Highest education  level: Not on file    Occupational History      Occupation: Nursery (Agriculture)        Employer: [employer]    Social Needs      Financial resource strain: Not on file      Food insecurity        Worry: Not on file        Inability: Not on file      Transportation needs        Medical: Not on file        Non-medical: Not on file    Tobacco Use      Smoking status: Never Smoker      Smokeless tobacco: Never Used    Substance and Sexual Activity      Alcohol use: No      Drug use: No      Sexual activity: Yes        Partners: Female    Lifestyle      Physical activity        Days per week: Not on file        Minutes per session: Not on file      Stress: Not on file    Relationships      Social connections        Talks on phone: Not on file        Gets together: Not on file        Attends Worship service: Not on file        Active member of club or organization: Not on file        Attends meetings of clubs or organizations: Not on file        Relationship status: Not on file    Other Topics      Concerns:        Not on file    Social History Narrative      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 He and his wife are very aware of the need for ongoing glaucoma care.      Current Outpatient Medications:  bimatoprost (LUMIGAN) 0.03 % ophthalmic drops, Place 1 drop into both eyes nightly. Every evening, Disp: , Rfl:   brimonidine (ALPHAGAN) 0.2 % ophthalmic solution, Place 1 drop into both eyes 3 (three) times daily. Three times a day, Disp: , Rfl:   cholecalciferol, vitamin D3, (VITAMIN D-3) 1,000 unit Chew, Take 1 tablet by mouth once daily. Every day, Disp: , Rfl:   coenzyme Q10 (CO Q-10) 100 mg capsule, Take 1 capsule by mouth 2 (two) times daily. Every day, Disp: , Rfl:   dorzolamide-timolol (COSOPT) 2-0.5 % ophthalmic solution, Place 1 drop into both eyes 2 (two) times daily. Three times a day, Disp: , Rfl:   DUREZOL 0.05 % Drop ophthalmic solution, , Disp: , Rfl:   fish oil-omega-3 fatty acids  300-1,000 mg capsule, Take 2 g by mouth once daily., Disp: , Rfl:   fluticasone (FLONASE) 50 mcg/actuation nasal spray, 2 sprays (100 mcg total) by Each Nare route once daily. (Patient not taking: Reported on 1/29/2020), Disp: 1 Bottle, Rfl: 12  latanoprost 0.005 % ophthalmic solution, , Disp: , Rfl:   lutein 10 mg Tab, Take by mouth., Disp: , Rfl:   magnesium oxide-Mg AA chelate (MG-PLUS-PROTEIN) 133 mg Tab, Take by mouth., Disp: , Rfl:   pilocarpine HCL 2% (PILOCAR) 2 % ophthalmic solution, , Disp: , Rfl:   tamsulosin (FLOMAX) 0.4 mg Cap, TAKE 2 CAPSULES NIGHTLY, Disp: 180 capsule, Rfl: 2  verapamiL (VERELAN) 180 MG C24P, Take 1 capsule (180 mg total) by mouth once daily for 14 days, Disp: 14 capsule, Rfl: 0    No current facility-administered medications for this visit.       Review of patient's allergies indicates:   -- No known allergies     Review of Systems   Constitutional: Negative.  Negative for chills and fever.   HENT: Negative.    Eyes: Positive for visual disturbance.   Respiratory: Negative.  Negative for cough, shortness of breath and wheezing.    Cardiovascular: Negative.  Negative for chest pain, palpitations and leg swelling.   Gastrointestinal: Positive for constipation.   Genitourinary: Negative.    Musculoskeletal: Negative.    Integumentary:  Negative.   Neurological: Negative.  Negative for dizziness, light-headedness and headaches.   Hematological: Negative.    Psychiatric/Behavioral: Negative.          Objective:      Physical Exam  Constitutional:       Appearance: Normal appearance.   HENT:      Head: Normocephalic and atraumatic.      Nose: Nose normal.   Eyes:      Pupils: Pupils are equal, round, and reactive to light.   Cardiovascular:      Rate and Rhythm: Normal rate and regular rhythm.      Heart sounds: No murmur.   Pulmonary:      Effort: Pulmonary effort is normal. No respiratory distress.      Breath sounds: Normal breath sounds.   Abdominal:      General: Abdomen is flat.  Bowel sounds are normal. There is no distension.      Palpations: There is no mass.      Tenderness: There is no abdominal tenderness.   Musculoskeletal:         General: No swelling.   Skin:     General: Skin is warm and dry.   Neurological:      General: No focal deficit present.      Mental Status: He is alert and oriented to person, place, and time.   Psychiatric:         Mood and Affect: Mood normal.         Behavior: Behavior normal.         Assessment:       1. Constipation, unspecified constipation type    2. Pre-op exam    3. Essential hypertension    4. Prostate cancer screening        Plan:       1. Constipation, unspecified constipation type  Colonoscopy done in 2019. Check labs and Stool for blood and UA. Add fiber supplement, see Gi if not improving.   - CBC auto differential; Future  - Comprehensive metabolic panel; Future  - TSH; Future  - Amylase; Future  - Lipase; Future  - Occult blood x 1, stool; Future    2. Pre-op exam  Cleared for planned surgery  - CBC auto differential; Future  - Comprehensive metabolic panel; Future    3. Essential hypertension  Stable, refill medications, labs scheduled. See PCP in 6 months.   - Lipid Panel; Future    4. Prostate cancer screening  Stale, continue current medication, see pcp in 6 months.   - PSA, Screening; Future

## 2020-07-28 NOTE — PATIENT INSTRUCTIONS
Start a fiber supplement daily, Fibercon, Metamucil, Citrucil, or Fiber One are all good options, follow direction on the bottle.

## 2020-07-30 LAB — OB PNL STL: NEGATIVE

## 2020-08-24 ENCOUNTER — PATIENT OUTREACH (OUTPATIENT)
Dept: ADMINISTRATIVE | Facility: OTHER | Age: 75
End: 2020-08-24

## 2020-08-24 NOTE — PROGRESS NOTES
Health Maintenance Due   Topic Date Due    TETANUS VACCINE  08/10/1963    Shingles Vaccine (2 of 3) 01/09/2015     Updates were requested from care everywhere.  Chart was reviewed for overdue Proactive [facility] Encounters (JUDY) topics (CRS, Breast Cancer Screening, Eye exam)  Health Maintenance has been updated.  LINKS immunization registry triggered.  Immunizations were reconciled.

## 2020-08-26 ENCOUNTER — OFFICE VISIT (OUTPATIENT)
Dept: UROLOGY | Facility: CLINIC | Age: 75
End: 2020-08-26
Payer: MEDICARE

## 2020-08-26 VITALS
BODY MASS INDEX: 28.44 KG/M2 | DIASTOLIC BLOOD PRESSURE: 77 MMHG | WEIGHT: 192 LBS | SYSTOLIC BLOOD PRESSURE: 123 MMHG | HEART RATE: 72 BPM | HEIGHT: 69 IN

## 2020-08-26 DIAGNOSIS — N20.0 NEPHROLITHIASIS: ICD-10-CM

## 2020-08-26 DIAGNOSIS — R39.12 BENIGN PROSTATIC HYPERPLASIA WITH WEAK URINARY STREAM: ICD-10-CM

## 2020-08-26 DIAGNOSIS — R97.20 ELEVATED PSA: Primary | Chronic | ICD-10-CM

## 2020-08-26 DIAGNOSIS — N28.1 RENAL CYST: ICD-10-CM

## 2020-08-26 DIAGNOSIS — N40.1 BENIGN PROSTATIC HYPERPLASIA WITH WEAK URINARY STREAM: ICD-10-CM

## 2020-08-26 LAB
BILIRUB SERPL-MCNC: NORMAL MG/DL
BLOOD URINE, POC: NORMAL
CLARITY, POC UA: CLEAR
COLOR, POC UA: YELLOW
GLUCOSE UR QL STRIP: NORMAL
KETONES UR QL STRIP: NORMAL
LEUKOCYTE ESTERASE URINE, POC: NORMAL
NITRITE, POC UA: NORMAL
PH, POC UA: 6.5
PROTEIN, POC: NORMAL
SPECIFIC GRAVITY, POC UA: 1.02
UROBILINOGEN, POC UA: NORMAL

## 2020-08-26 PROCEDURE — 3008F BODY MASS INDEX DOCD: CPT | Mod: HCNC,CPTII,S$GLB, | Performed by: UROLOGY

## 2020-08-26 PROCEDURE — 1159F PR MEDICATION LIST DOCUMENTED IN MEDICAL RECORD: ICD-10-PCS | Mod: HCNC,S$GLB,, | Performed by: UROLOGY

## 2020-08-26 PROCEDURE — 99999 PR PBB SHADOW E&M-EST. PATIENT-LVL III: CPT | Mod: PBBFAC,HCNC,, | Performed by: UROLOGY

## 2020-08-26 PROCEDURE — 3078F DIAST BP <80 MM HG: CPT | Mod: HCNC,CPTII,S$GLB, | Performed by: UROLOGY

## 2020-08-26 PROCEDURE — 1126F PR PAIN SEVERITY QUANTIFIED, NO PAIN PRESENT: ICD-10-PCS | Mod: HCNC,S$GLB,, | Performed by: UROLOGY

## 2020-08-26 PROCEDURE — 3008F PR BODY MASS INDEX (BMI) DOCUMENTED: ICD-10-PCS | Mod: HCNC,CPTII,S$GLB, | Performed by: UROLOGY

## 2020-08-26 PROCEDURE — 99999 PR PBB SHADOW E&M-EST. PATIENT-LVL III: ICD-10-PCS | Mod: PBBFAC,HCNC,, | Performed by: UROLOGY

## 2020-08-26 PROCEDURE — 1159F MED LIST DOCD IN RCRD: CPT | Mod: HCNC,S$GLB,, | Performed by: UROLOGY

## 2020-08-26 PROCEDURE — 3074F PR MOST RECENT SYSTOLIC BLOOD PRESSURE < 130 MM HG: ICD-10-PCS | Mod: HCNC,CPTII,S$GLB, | Performed by: UROLOGY

## 2020-08-26 PROCEDURE — 1101F PT FALLS ASSESS-DOCD LE1/YR: CPT | Mod: HCNC,CPTII,S$GLB, | Performed by: UROLOGY

## 2020-08-26 PROCEDURE — 99204 OFFICE O/P NEW MOD 45 MIN: CPT | Mod: HCNC,25,S$GLB, | Performed by: UROLOGY

## 2020-08-26 PROCEDURE — 81002 URINALYSIS NONAUTO W/O SCOPE: CPT | Mod: HCNC,S$GLB,, | Performed by: UROLOGY

## 2020-08-26 PROCEDURE — 81002 POCT URINE DIPSTICK WITHOUT MICROSCOPE: ICD-10-PCS | Mod: HCNC,S$GLB,, | Performed by: UROLOGY

## 2020-08-26 PROCEDURE — 3074F SYST BP LT 130 MM HG: CPT | Mod: HCNC,CPTII,S$GLB, | Performed by: UROLOGY

## 2020-08-26 PROCEDURE — 1101F PR PT FALLS ASSESS DOC 0-1 FALLS W/OUT INJ PAST YR: ICD-10-PCS | Mod: HCNC,CPTII,S$GLB, | Performed by: UROLOGY

## 2020-08-26 PROCEDURE — 1126F AMNT PAIN NOTED NONE PRSNT: CPT | Mod: HCNC,S$GLB,, | Performed by: UROLOGY

## 2020-08-26 PROCEDURE — 3078F PR MOST RECENT DIASTOLIC BLOOD PRESSURE < 80 MM HG: ICD-10-PCS | Mod: HCNC,CPTII,S$GLB, | Performed by: UROLOGY

## 2020-08-26 PROCEDURE — 99204 PR OFFICE/OUTPT VISIT, NEW, LEVL IV, 45-59 MIN: ICD-10-PCS | Mod: HCNC,25,S$GLB, | Performed by: UROLOGY

## 2020-08-26 RX ORDER — BROMFENAC SODIUM 0.7 MG/ML
SOLUTION/ DROPS OPHTHALMIC
COMMUNITY
Start: 2020-08-04 | End: 2023-05-18

## 2020-08-26 RX ORDER — PREDNISOLONE ACETATE 10 MG/ML
SUSPENSION/ DROPS OPHTHALMIC
COMMUNITY
Start: 2020-08-04 | End: 2020-08-26

## 2020-08-26 NOTE — LETTER
August 26, 2020      Avis Stoner NP  [address] [facility] [address] [place] [address]           [facility]  [address] [place] [address]  Phone: [phone]  Fax: [phone]          Patient: Jayro Mattson   MR Number: [id number]   YOB: 1945   Date of Visit: 8/26/2020       Dear Avis Stoner:    Thank you for referring Jayro Mattson to me for evaluation. Attached you will find relevant portions of my assessment and plan of care.    If you have questions, please do not hesitate to call me. I look forward to following Jayro Mattson along with you.    Sincerely,    Dominick Hanna MD    Enclosure  CC:  No Recipients    If you would like to receive this communication electronically, please contact [email or web address] or [phone] to request more information on [facility] [employer] Link access.    For providers and/or their staff who would like to refer a patient to [facility], please contact us through our one-stop-shop provider referral line, [facility] , at [phone].    If you feel you have received this communication in error or would no longer like to receive these types of communications, please e-mail [email or web address]

## 2020-09-22 ENCOUNTER — OFFICE VISIT (OUTPATIENT)
Dept: FAMILY MEDICINE | Facility: CLINIC | Age: 75
End: 2020-09-22
Payer: MEDICARE

## 2020-09-22 VITALS
WEIGHT: 195.56 LBS | OXYGEN SATURATION: 99 % | BODY MASS INDEX: 28.88 KG/M2 | DIASTOLIC BLOOD PRESSURE: 78 MMHG | HEART RATE: 77 BPM | SYSTOLIC BLOOD PRESSURE: 124 MMHG

## 2020-09-22 DIAGNOSIS — R97.20 ELEVATED PSA: Chronic | ICD-10-CM

## 2020-09-22 DIAGNOSIS — I10 ESSENTIAL HYPERTENSION: Primary | Chronic | ICD-10-CM

## 2020-09-22 DIAGNOSIS — K59.00 CONSTIPATION, UNSPECIFIED CONSTIPATION TYPE: ICD-10-CM

## 2020-09-22 DIAGNOSIS — H40.9 GLAUCOMA, UNSPECIFIED GLAUCOMA TYPE, UNSPECIFIED LATERALITY: Chronic | ICD-10-CM

## 2020-09-22 PROCEDURE — 1101F PR PT FALLS ASSESS DOC 0-1 FALLS W/OUT INJ PAST YR: ICD-10-PCS | Mod: HCNC,CPTII,S$GLB, | Performed by: EMERGENCY MEDICINE

## 2020-09-22 PROCEDURE — 1159F PR MEDICATION LIST DOCUMENTED IN MEDICAL RECORD: ICD-10-PCS | Mod: HCNC,S$GLB,, | Performed by: EMERGENCY MEDICINE

## 2020-09-22 PROCEDURE — 3074F PR MOST RECENT SYSTOLIC BLOOD PRESSURE < 130 MM HG: ICD-10-PCS | Mod: HCNC,CPTII,S$GLB, | Performed by: EMERGENCY MEDICINE

## 2020-09-22 PROCEDURE — 99213 OFFICE O/P EST LOW 20 MIN: CPT | Mod: HCNC,25,S$GLB, | Performed by: EMERGENCY MEDICINE

## 2020-09-22 PROCEDURE — 3074F SYST BP LT 130 MM HG: CPT | Mod: HCNC,CPTII,S$GLB, | Performed by: EMERGENCY MEDICINE

## 2020-09-22 PROCEDURE — 3008F BODY MASS INDEX DOCD: CPT | Mod: HCNC,CPTII,S$GLB, | Performed by: EMERGENCY MEDICINE

## 2020-09-22 PROCEDURE — 99999 PR PBB SHADOW E&M-EST. PATIENT-LVL III: ICD-10-PCS | Mod: PBBFAC,HCNC,, | Performed by: EMERGENCY MEDICINE

## 2020-09-22 PROCEDURE — 99999 PR PBB SHADOW E&M-EST. PATIENT-LVL III: CPT | Mod: PBBFAC,HCNC,, | Performed by: EMERGENCY MEDICINE

## 2020-09-22 PROCEDURE — 3078F PR MOST RECENT DIASTOLIC BLOOD PRESSURE < 80 MM HG: ICD-10-PCS | Mod: HCNC,CPTII,S$GLB, | Performed by: EMERGENCY MEDICINE

## 2020-09-22 PROCEDURE — 90694 FLU VACCINE - QUADRIVALENT - ADJUVANTED: ICD-10-PCS | Mod: HCNC,S$GLB,, | Performed by: EMERGENCY MEDICINE

## 2020-09-22 PROCEDURE — G0008 FLU VACCINE - QUADRIVALENT - ADJUVANTED: ICD-10-PCS | Mod: HCNC,S$GLB,, | Performed by: EMERGENCY MEDICINE

## 2020-09-22 PROCEDURE — 3008F PR BODY MASS INDEX (BMI) DOCUMENTED: ICD-10-PCS | Mod: HCNC,CPTII,S$GLB, | Performed by: EMERGENCY MEDICINE

## 2020-09-22 PROCEDURE — 99213 PR OFFICE/OUTPT VISIT, EST, LEVL III, 20-29 MIN: ICD-10-PCS | Mod: HCNC,25,S$GLB, | Performed by: EMERGENCY MEDICINE

## 2020-09-22 PROCEDURE — 1126F PR PAIN SEVERITY QUANTIFIED, NO PAIN PRESENT: ICD-10-PCS | Mod: HCNC,S$GLB,, | Performed by: EMERGENCY MEDICINE

## 2020-09-22 PROCEDURE — G0008 ADMIN INFLUENZA VIRUS VAC: HCPCS | Mod: HCNC,S$GLB,, | Performed by: EMERGENCY MEDICINE

## 2020-09-22 PROCEDURE — 90694 VACC AIIV4 NO PRSRV 0.5ML IM: CPT | Mod: HCNC,S$GLB,, | Performed by: EMERGENCY MEDICINE

## 2020-09-22 PROCEDURE — 3078F DIAST BP <80 MM HG: CPT | Mod: HCNC,CPTII,S$GLB, | Performed by: EMERGENCY MEDICINE

## 2020-09-22 PROCEDURE — 1159F MED LIST DOCD IN RCRD: CPT | Mod: HCNC,S$GLB,, | Performed by: EMERGENCY MEDICINE

## 2020-09-22 PROCEDURE — 1126F AMNT PAIN NOTED NONE PRSNT: CPT | Mod: HCNC,S$GLB,, | Performed by: EMERGENCY MEDICINE

## 2020-09-22 PROCEDURE — 1101F PT FALLS ASSESS-DOCD LE1/YR: CPT | Mod: HCNC,CPTII,S$GLB, | Performed by: EMERGENCY MEDICINE

## 2020-09-22 RX ORDER — LOSARTAN POTASSIUM 50 MG/1
50 TABLET ORAL DAILY
Qty: 30 TABLET | Refills: 3 | Status: SHIPPED | OUTPATIENT
Start: 2020-09-22 | End: 2020-10-05 | Stop reason: SDUPTHER

## 2020-09-22 RX ORDER — BROMPHENIRAMINE MALEATE, DEXTROMETHORPHAN HBR, PHENYLEPHRINE HCL, DIPHENHYDRAMINE HCL, PHENYLEPHRINE HCL 0.52G
0.52 KIT ORAL DAILY
COMMUNITY
End: 2023-05-18

## 2020-09-22 NOTE — PATIENT INSTRUCTIONS
Jayro,    Stop the verapamil blood pressure medication.    Start Losartan.    We will call  your wife in two weeks.      Losartan tablets  What is this medicine?  LOSARTAN (ambika EDGAR tan) is used to treat high blood pressure and to reduce the risk of stroke in certain patients. This drug also slows the progression of kidney disease in patients with diabetes.  How should I use this medicine?  Take this medicine by mouth with a glass of water. Follow the directions on the prescription label. This medicine can be taken with or without food. Take your doses at regular intervals. Do not take your medicine more often than directed.  Talk to your pediatrician regarding the use of this medicine in children. Special care may be needed.  What side effects may I notice from receiving this medicine?  Side effects that you should report to your doctor or health care professional as soon as possible:  · confusion, dizziness, light headedness or fainting spells  · decreased amount of urine passed  · difficulty breathing or swallowing, hoarseness, or tightening of the throat  · fast or irregular heart beat, palpitations, or chest pain  · skin rash, itching  · swelling of your face, lips, tongue, hands, or feet  Side effects that usually do not require medical attention (report to your doctor or health care professional if they continue or are bothersome):  · cough  · decreased sexual function or desire  · headache  · nasal congestion or stuffiness  · nausea or stomach pain  · sore or cramping muscles  What may interact with this medicine?  · blood pressure medicines  · diuretics, especially triamterene, spironolactone, or amiloride  · fluconazole  · NSAIDs, medicines for pain and inflammation, like ibuprofen or naproxen  · potassium salts or potassium supplements  · rifampin  What if I miss a dose?  If you miss a dose, take it as soon as you can. If it is almost time for your next dose, take only that dose. Do not take double or  extra doses.  Where should I keep my medicine?  Keep out of the reach of children.  Store at room temperature between 15 and 30 degrees C (59 and 86 degrees F). Protect from light. Keep container tightly closed. Throw away any unused medicine after the expiration date.  What should I tell my health care provider before I take this medicine?  They need to know if you have any of these conditions:  · heart failure  · kidney or liver disease  · an unusual or allergic reaction to losartan, other medicines, foods, dyes, or preservatives  · pregnant or trying to get pregnant  · breast-feeding  What should I watch for while using this medicine?  Visit your doctor or health care professional for regular checks on your progress. Check your blood pressure as directed. Ask your doctor or health care professional what your blood pressure should be and when you should contact him or her. Call your doctor or health care professional if you notice an irregular or fast heart beat.  Women should inform their doctor if they wish to become pregnant or think they might be pregnant. There is a potential for serious side effects to an unborn child, particularly in the second or third trimester. Talk to your health care professional or pharmacist for more information.  You may get drowsy or dizzy. Do not drive, use machinery, or do anything that needs mental alertness until you know how this drug affects you. Do not stand or sit up quickly, especially if you are an older patient. This reduces the risk of dizzy or fainting spells. Alcohol can make you more drowsy and dizzy. Avoid alcoholic drinks.  Avoid salt substitutes unless you are told otherwise by your doctor or health care professional.  Do not treat yourself for coughs, colds, or pain while you are taking this medicine without asking your doctor or health care professional for advice. Some ingredients may increase your blood pressure.  NOTE:This sheet is a summary. It may not cover  all possible information. If you have questions about this medicine, talk to your doctor, pharmacist, or health care provider. Copyright© 2017 [employer]

## 2020-09-22 NOTE — PROGRESS NOTES
Subjective:   THIS NOTE IS DONE WITH VOICE RECOGNITION        Patient ID: Jayro Mattson is a 75 y.o. male.    Chief Complaint: Hypertension (one month follow up) and Constipation      HPI     His brother has passed away with pancreatic cancer.  No other family members have had this particular disease or other cancers.  At this time, no special screening is recommended based on this history.    Jayro is having issues with constipation.  He has been using OTC fiber product.  No bleeding.  The importance of regular fiber in his diet as well as supplemental fiber reviewed.  He is comfortable with this advice.    Colonoscopy 2019 was unremarkable.    Glaucoma is being followed closely.  Recent surgery, left eye.  Dr. Jonn Estrada in [place].    Here to follow up on blood pressure.  Taking current medications.    BP Readings from Last 3 Encounters:   09/22/20 124/78   08/26/20 123/77   07/28/20 134/78     He is known to have elevated PSA.  He is not experiencing structure of symptomatology nor hematuria.  Regular follow-up is recommended.     Ref. Range 10/29/2015 10:21 10/18/2016 12:28 8/7/2018 07:49 5/21/2019 15:41 7/28/2020 10:36   PSA DIAGNOSTIC Latest Ref Range: 0.00 - 4.00 ng/mL 6.0 (H) 6.8 (H) 6.0 (H) 6.1 (H)      Immunization History   Administered Date(s) Administered    Influenza (FLUAD) - Quadrivalent - Adjuvanted - PF *Preferred* (65+) 09/22/2020    Influenza - High Dose - PF (65 years and older) 11/14/2014, 10/18/2016, 11/20/2018, 09/12/2019    Influenza Split 12/08/2009, 09/28/2010, 02/10/2012    Pneumococcal Conjugate - 13 Valent 10/18/2016    Pneumococcal Polysaccharide - 23 Valent 05/06/2014    Zoster 11/14/2014     Recombinant shingles vaccine recommended, he will consider this.    Influenza vaccine administered today.      Current Outpatient Medications   Medication Sig Dispense Refill    brimonidine (ALPHAGAN) 0.2 % ophthalmic solution Place 1 drop into both eyes 3 (three) times daily.  Three times a day      cholecalciferol, vitamin D3, (VITAMIN D-3) 1,000 unit Chew Take 1 tablet by mouth once daily. Every day      coenzyme Q10 (CO Q-10) 100 mg capsule Take 1 capsule by mouth Daily. Every day      dorzolamide-timolol (COSOPT) 2-0.5 % ophthalmic solution Place 1 drop into both eyes 2 (two) times daily. Three times a day      DUREZOL 0.05 % Drop ophthalmic solution       fish oil-omega-3 fatty acids 300-1,000 mg capsule Take 2 g by mouth once daily.      latanoprost 0.005 % ophthalmic solution       magnesium oxide-Mg AA chelate (MG-PLUS-PROTEIN) 133 mg Tab Take by mouth.      pilocarpine HCL 2% (PILOCAR) 2 % ophthalmic solution       PROLENSA 0.07 % Drop PLACE 1 DROP INTO LEFT EYE EVERY DAY      psyllium 0.52 gram capsule Take 0.52 g by mouth once daily.      tamsulosin (FLOMAX) 0.4 mg Cap TAKE 2 CAPSULES NIGHTLY 180 capsule 2    verapamiL (VERELAN) 180 MG C24P Take 1 capsule (180 mg total) by mouth once daily for 14 days 14 capsule 0     No current facility-administered medications for this visit.          Review of Systems   Constitutional: Negative for activity change, chills, fever and unexpected weight change.   HENT: Negative for hearing loss, nosebleeds and tinnitus.    Eyes: Positive for redness and visual disturbance. Negative for discharge and itching.   Respiratory: Negative for cough, choking, chest tightness, shortness of breath and wheezing.    Cardiovascular: Negative for chest pain, palpitations and leg swelling.   Gastrointestinal: Positive for constipation. Negative for abdominal distention.   Genitourinary: Negative for difficulty urinating, dysuria, flank pain, hematuria and urgency.   Musculoskeletal: Negative for arthralgias, back pain and joint swelling.   Neurological: Negative for dizziness, tremors, seizures, numbness and headaches.   Psychiatric/Behavioral: Negative for confusion, dysphoric mood and hallucinations.       Objective:      Physical Exam  Vitals  signs reviewed.   Constitutional:       General: He is not in acute distress.     Appearance: He is well-developed.   HENT:      Head: Normocephalic and atraumatic.      Right Ear: Tympanic membrane and external ear normal.      Left Ear: Tympanic membrane and external ear normal.      Nose: Nose normal.      Mouth/Throat:      Pharynx: No oropharyngeal exudate.   Eyes:      General: Lids are normal. No scleral icterus.     Extraocular Movements: Extraocular movements intact.      Pupils: Pupils are equal, round, and reactive to light.      Comments: Conjunctival injection bilaterally.  This is unchanged.  Likely result of his eyedrops for his significant glaucoma.  He is followed regularly by his ophthalmologist.   Neck:      Musculoskeletal: Normal range of motion and neck supple.      Thyroid: No thyromegaly.   Cardiovascular:      Rate and Rhythm: Normal rate and regular rhythm.      Pulses: Normal pulses.      Heart sounds: Normal heart sounds. No murmur.   Pulmonary:      Effort: Pulmonary effort is normal.      Breath sounds: Normal breath sounds. No wheezing or rales.   Abdominal:      General: Bowel sounds are normal. There is no distension.      Palpations: Abdomen is soft. There is no mass.      Tenderness: There is no abdominal tenderness.      Hernia: No hernia is present.   Musculoskeletal: Normal range of motion.         General: No tenderness.   Lymphadenopathy:      Cervical: No cervical adenopathy.   Skin:     General: Skin is warm and dry.      Capillary Refill: Capillary refill takes less than 2 seconds.      Findings: No rash.   Neurological:      General: No focal deficit present.      Mental Status: He is alert and oriented to person, place, and time.      Motor: No abnormal muscle tone.      Coordination: Coordination normal.      Deep Tendon Reflexes: Reflexes are normal and symmetric.   Psychiatric:         Mood and Affect: Mood normal.         Behavior: Behavior normal.         Thought  Content: Thought content normal.         Judgment: Judgment normal.         Assessment:       1. Essential hypertension    2. Constipation, unspecified constipation type    3. Elevated PSA    4. Glaucoma, unspecified glaucoma type, unspecified laterality        Plan:       1. Essential hypertension  Controlled.  Continue current medications  Continue to avoid excessive sodium  Continue home monitoring  Target blood pressure is 139/89 or less  - losartan (COZAAR) 50 MG tablet; Take 1 tablet (50 mg total) by mouth once daily.  Dispense: 30 tablet; Refill: 3    2. Constipation, unspecified constipation type  Likely functional  Most recent colonoscopy unremarkable  Over-the-counter fiber products discussed.  A focus on dietary changes that would increase his fiber encouraged.  Avoid laxatives    3. Elevated PSA  Stable  Regular monitoring advised    4. Glaucoma, unspecified glaucoma type, unspecified laterality  Significant  Continue to follow-up with Dr. Estrada

## 2020-09-29 ENCOUNTER — PATIENT MESSAGE (OUTPATIENT)
Dept: OTHER | Facility: OTHER | Age: 75
End: 2020-09-29

## 2020-10-05 DIAGNOSIS — I10 ESSENTIAL HYPERTENSION: Chronic | ICD-10-CM

## 2020-10-05 NOTE — TELEPHONE ENCOUNTER
No new care gaps identified.  Powered by [employer]. Reference number: [id number]. 10/05/2020 4:30:19 PM   CDT

## 2020-10-05 NOTE — TELEPHONE ENCOUNTER
----- Message from Kim Hdz sent at 10/5/2020  4:16 PM CDT -----  Regarding: Prescription  Contact: patient    type:  RX Refill Request    Who Called:  patient  Refill or New Rx:  refill  RX Name and Strength:  not sure(blood pressure meds)  How is the patient currently taking it? (ex. 1XDay):  1xday  Is this a 30 day or 90 day RX:  n/a    Preferred Pharmacy with phone number:  [facility] [address]  [address] [facility] [address]  Phone: [phone] Fax: [phone]    Local or Mail Order:  local  Ordering Provider:  Josue Cason Call Back Number:  [phone]  Additional Information:  wants it sent to [facility] pharmacy

## 2020-10-07 RX ORDER — LOSARTAN POTASSIUM 50 MG/1
50 TABLET ORAL DAILY
Qty: 90 TABLET | Refills: 3 | Status: SHIPPED | OUTPATIENT
Start: 2020-10-07 | End: 2021-05-24 | Stop reason: SDUPTHER

## 2020-10-07 NOTE — PROGRESS NOTES
Refill Routing Note   Medication(s) are not appropriate for processing by [facility] for the following reason(s):     - Medication is a new start (<3 months)    [facility] actions taken in this encounter: Defer       Medication Therapy Plan: Pt requesting medication to mail order  Medication reconciliation completed: No   Automatic Epic Generated Protocol Data:        Requested Prescriptions   Pending Prescriptions Disp Refills    losartan (COZAAR) 50 MG tablet 90 tablet 0     Sig: Take 1 tablet (50 mg total) by mouth once daily.       Cardiovascular:  Angiotensin Receptor Blockers Passed - 10/5/2020  4:30 PM        Passed - Patient is at least 18 years old        Passed - Last BP in normal range within 360 days     BP Readings from Last 3 Encounters:   09/22/20 124/78   08/26/20 123/77   07/28/20 134/78              Passed - Office Visit within last 12 months or future 90 days.     Recent Outpatient Visits            2 weeks ago Essential hypertension    [facility] Franko Salas Jr., MD    1 month ago Elevated PSA    [facility] Dominick Hanna MD    2 months ago Constipation, unspecified constipation type    [facility] Avis Stoner NP    8 months ago Encounter for preventive health examination    [facility] Mely Triana NP    10 months ago Essential hypertension    [facility] Franko Salas Jr., MD          Future Appointments              In 4 months LAB, [place] [facility]    In 4 months Dominick Hanna MD [facility]                Passed - Cr is 1.3 or below and within 360 days     Creatinine   Date Value Ref Range Status   07/28/2020 1.1 0.5 - 1.4 mg/dL Final   05/21/2019 1.1 0.5 - 1.4 mg/dL Final   08/07/2018 1.0 0.5 - 1.4 mg/dL Final              Passed - K in normal range and within 360 days     Potassium   Date Value Ref Range Status   07/28/2020  4.4 3.5 - 5.1 mmol/L Final   05/21/2019 4.3 3.5 - 5.1 mmol/L Final   08/07/2018 4.5 3.5 - 5.1 mmol/L Final              Passed - eGFR within 360 days     eGFR if non    Date Value Ref Range Status   07/28/2020 >60.0 >60 mL/min/1.73 m^2 Final     Comment:     Calculation used to obtain the estimated glomerular filtration  rate (eGFR) is the CKD-EPI equation.      05/21/2019 >60.0 >60 mL/min/1.73 m^2 Final     Comment:     Calculation used to obtain the estimated glomerular filtration  rate (eGFR) is the CKD-EPI equation.      08/07/2018 >60.0 >60 mL/min/1.73 m^2 Final     Comment:     Calculation used to obtain the estimated glomerular filtration  rate (eGFR) is the CKD-EPI equation.        eGFR if    Date Value Ref Range Status   07/28/2020 >60.0 >60 mL/min/1.73 m^2 Final   05/21/2019 >60.0 >60 mL/min/1.73 m^2 Final   08/07/2018 >60.0 >60 mL/min/1.73 m^2 Final                    Appointments  past 12m or future 3m with PCP    Date Provider   Last Visit   9/22/2020 Franko Salas Jr., MD   Next Visit   Visit date not found Franko Salas Jr., MD   ED visits in past 90 days: 0        Note composed:8:09 PM 10/06/2020

## 2020-10-08 DIAGNOSIS — N40.0 BENIGN PROSTATIC HYPERPLASIA: ICD-10-CM

## 2020-10-08 RX ORDER — TAMSULOSIN HYDROCHLORIDE 0.4 MG/1
CAPSULE ORAL
Qty: 180 CAPSULE | Refills: 3 | Status: SHIPPED | OUTPATIENT
Start: 2020-10-08 | End: 2021-02-24 | Stop reason: SDUPTHER

## 2020-10-08 NOTE — TELEPHONE ENCOUNTER
I spoke with pt he is aware that losartan has been sent to [facility]   He would like his flomax sent also.

## 2020-10-08 NOTE — TELEPHONE ENCOUNTER
----- Message from Kim Hdz sent at 10/8/2020  9:48 AM CDT -----  Regarding: refill  Contact: patient  Type:  RX Refill Request    Who Called:  patient  Refill or New Rx:  refill  RX Name and Strength:  blood pressure meds  How is the patient currently taking it? (ex. 1XDay):  1 time a day  Is this a 30 day or 90 day RX:  n/a  Preferred Pharmacy with phone number:     [facility] [address]  [address] [facility] [address]  Phone: [phone] Fax: [phone]      Local or Mail Order:  mail  Ordering Provider:  Josue  Best Call Back Number:  [phone] (work)  [phone] (cell)    Additional Information:  pt states he would like the medication that Dr Salas gave him a month supply for at his last appt as well as a prescription for Flomax

## 2020-10-08 NOTE — TELEPHONE ENCOUNTER
No new care gaps identified.  Powered by [employer]. Reference number: [id number]. 10/08/2020 10:00:12 AM   CDT

## 2020-10-22 ENCOUNTER — TELEPHONE (OUTPATIENT)
Dept: FAMILY MEDICINE | Facility: CLINIC | Age: 75
End: 2020-10-22

## 2020-10-22 NOTE — TELEPHONE ENCOUNTER
----- Message from Sy Fernández sent at 10/22/2020 12:57 PM CDT -----  Type: Needs Medical Advice  Who Called:  Patient    Best Call Back Number: [phone]  Additional Information: Patient states that he would like a callback regarding stopping by the office to get his surgery clearance paperwork signed for emergency eye surgery.

## 2020-10-23 ENCOUNTER — OFFICE VISIT (OUTPATIENT)
Dept: FAMILY MEDICINE | Facility: CLINIC | Age: 75
End: 2020-10-23
Payer: MEDICARE

## 2020-10-23 VITALS
TEMPERATURE: 98 F | BODY MASS INDEX: 28.96 KG/M2 | HEART RATE: 77 BPM | OXYGEN SATURATION: 96 % | SYSTOLIC BLOOD PRESSURE: 126 MMHG | HEIGHT: 69 IN | DIASTOLIC BLOOD PRESSURE: 74 MMHG | WEIGHT: 195.56 LBS

## 2020-10-23 DIAGNOSIS — H40.9 GLAUCOMA, UNSPECIFIED GLAUCOMA TYPE, UNSPECIFIED LATERALITY: Primary | Chronic | ICD-10-CM

## 2020-10-23 DIAGNOSIS — I10 ESSENTIAL HYPERTENSION: Chronic | ICD-10-CM

## 2020-10-23 DIAGNOSIS — R97.20 ELEVATED PSA: ICD-10-CM

## 2020-10-23 PROCEDURE — 1101F PT FALLS ASSESS-DOCD LE1/YR: CPT | Mod: HCNC,CPTII,S$GLB, | Performed by: NURSE PRACTITIONER

## 2020-10-23 PROCEDURE — 3008F BODY MASS INDEX DOCD: CPT | Mod: HCNC,CPTII,S$GLB, | Performed by: NURSE PRACTITIONER

## 2020-10-23 PROCEDURE — 99499 UNLISTED E&M SERVICE: CPT | Mod: S$GLB,,, | Performed by: NURSE PRACTITIONER

## 2020-10-23 PROCEDURE — 99999 PR PBB SHADOW E&M-EST. PATIENT-LVL IV: ICD-10-PCS | Mod: PBBFAC,HCNC,, | Performed by: NURSE PRACTITIONER

## 2020-10-23 PROCEDURE — 1126F AMNT PAIN NOTED NONE PRSNT: CPT | Mod: HCNC,S$GLB,, | Performed by: NURSE PRACTITIONER

## 2020-10-23 PROCEDURE — 1126F PR PAIN SEVERITY QUANTIFIED, NO PAIN PRESENT: ICD-10-PCS | Mod: HCNC,S$GLB,, | Performed by: NURSE PRACTITIONER

## 2020-10-23 PROCEDURE — 1101F PR PT FALLS ASSESS DOC 0-1 FALLS W/OUT INJ PAST YR: ICD-10-PCS | Mod: HCNC,CPTII,S$GLB, | Performed by: NURSE PRACTITIONER

## 2020-10-23 PROCEDURE — 1159F PR MEDICATION LIST DOCUMENTED IN MEDICAL RECORD: ICD-10-PCS | Mod: HCNC,S$GLB,, | Performed by: NURSE PRACTITIONER

## 2020-10-23 PROCEDURE — 1159F MED LIST DOCD IN RCRD: CPT | Mod: HCNC,S$GLB,, | Performed by: NURSE PRACTITIONER

## 2020-10-23 PROCEDURE — 3008F PR BODY MASS INDEX (BMI) DOCUMENTED: ICD-10-PCS | Mod: HCNC,CPTII,S$GLB, | Performed by: NURSE PRACTITIONER

## 2020-10-23 PROCEDURE — 3078F PR MOST RECENT DIASTOLIC BLOOD PRESSURE < 80 MM HG: ICD-10-PCS | Mod: HCNC,CPTII,S$GLB, | Performed by: NURSE PRACTITIONER

## 2020-10-23 PROCEDURE — 99213 PR OFFICE/OUTPT VISIT, EST, LEVL III, 20-29 MIN: ICD-10-PCS | Mod: HCNC,S$GLB,, | Performed by: NURSE PRACTITIONER

## 2020-10-23 PROCEDURE — 99499 RISK ADDL DX/OHS AUDIT: ICD-10-PCS | Mod: S$GLB,,, | Performed by: NURSE PRACTITIONER

## 2020-10-23 PROCEDURE — 99213 OFFICE O/P EST LOW 20 MIN: CPT | Mod: HCNC,S$GLB,, | Performed by: NURSE PRACTITIONER

## 2020-10-23 PROCEDURE — 3078F DIAST BP <80 MM HG: CPT | Mod: HCNC,CPTII,S$GLB, | Performed by: NURSE PRACTITIONER

## 2020-10-23 PROCEDURE — 3074F PR MOST RECENT SYSTOLIC BLOOD PRESSURE < 130 MM HG: ICD-10-PCS | Mod: HCNC,CPTII,S$GLB, | Performed by: NURSE PRACTITIONER

## 2020-10-23 PROCEDURE — 3074F SYST BP LT 130 MM HG: CPT | Mod: HCNC,CPTII,S$GLB, | Performed by: NURSE PRACTITIONER

## 2020-10-23 PROCEDURE — 99999 PR PBB SHADOW E&M-EST. PATIENT-LVL IV: CPT | Mod: PBBFAC,HCNC,, | Performed by: NURSE PRACTITIONER

## 2020-10-23 RX ORDER — PREDNISOLONE ACETATE 10 MG/ML
SUSPENSION/ DROPS OPHTHALMIC
COMMUNITY
Start: 2020-10-13 | End: 2023-05-18

## 2020-10-23 NOTE — PROGRESS NOTES
Subjective:       Patient ID: Jayro Mattson is a 75 y.o. male.    Chief Complaint: Pre-op Exam    HPI   Left corneal transplant DSAEK (cornea transplant) with Dr Xavier scheduled 10/29 under local/MAC anesthesia. He has tolerated eye procedures in the past without complication. He is without complaints today     HTN: controlled     Elevated PSA followed by Dr. Hanna--stable, NOV 2/2021  Vitals:    10/23/20 1125   BP: 126/74   Pulse: 77   Temp: 98.1 °F (36.7 °C)     Review of Systems   Constitutional: Negative for diaphoresis and fever.   HENT: Negative for facial swelling and trouble swallowing.    Eyes: Positive for visual disturbance. Negative for discharge and redness.   Respiratory: Negative for cough and shortness of breath.    Cardiovascular: Positive for leg swelling. Negative for chest pain and palpitations.   Gastrointestinal: Negative for abdominal pain and diarrhea.   Genitourinary: Negative for difficulty urinating.   Musculoskeletal: Negative for gait problem.   Skin: Negative for pallor and rash.   Neurological: Negative for facial asymmetry and speech difficulty.   Psychiatric/Behavioral: Negative for confusion. The patient is not nervous/anxious.        Past Medical History:   Diagnosis Date    Allergy     BPH (benign prostatic hyperplasia)     Diverticulitis     Diverticulosis 3/17/2006    Seen at colonoscopy.    Elevated PSA     Glaucoma     LEGALLY BLIND    HEARING LOSS     Hypertension     Kidney stone      Objective:      Physical Exam  Vitals signs and nursing note reviewed.   Constitutional:       General: He is not in acute distress.     Appearance: He is not diaphoretic.   HENT:      Head: Normocephalic.      Right Ear: Hearing normal.      Left Ear: Hearing normal.      Nose: Nose normal.   Eyes:      General: Lids are normal.         Left eye: Discharge present.     Conjunctiva/sclera: Conjunctivae normal.   Neck:      Vascular: No JVD.      Trachea: No tracheal deviation.    Cardiovascular:      Rate and Rhythm: Normal rate.      Heart sounds: Normal heart sounds.   Pulmonary:      Effort: Pulmonary effort is normal.      Breath sounds: Normal breath sounds.   Abdominal:      General: There is no distension.   Musculoskeletal:         General: No deformity.      Right lower leg: No edema.      Left lower leg: No edema.   Skin:     Coloration: Skin is not pale.   Neurological:      Mental Status: He is alert and oriented to person, place, and time.   Psychiatric:         Speech: Speech normal.         Behavior: Behavior normal.         Thought Content: Thought content normal.         Judgment: Judgment normal.         Assessment:       1. Glaucoma, unspecified glaucoma type, unspecified laterality    2. Essential hypertension    3. Elevated PSA        Plan:       Glaucoma, unspecified glaucoma type, unspecified laterality    Essential hypertension    Elevated PSA    Without contraindication to scheduled procedure  FU routinely with PCP and as scheduled in February with Dr Hanna         Medication List with Changes/Refills   Current Medications    BRIMONIDINE (ALPHAGAN) 0.2 % OPHTHALMIC SOLUTION    Place 1 drop into both eyes 3 (three) times daily. Three times a day    CHOLECALCIFEROL, VITAMIN D3, (VITAMIN D-3) 1,000 UNIT CHEW    Take 1 tablet by mouth once daily. Every day    COENZYME Q10 (CO Q-10) 100 MG CAPSULE    Take 1 capsule by mouth Daily. Every day    DORZOLAMIDE-TIMOLOL (COSOPT) 2-0.5 % OPHTHALMIC SOLUTION    Place 1 drop into both eyes 2 (two) times daily. Three times a day    DUREZOL 0.05 % DROP OPHTHALMIC SOLUTION        FISH OIL-OMEGA-3 FATTY ACIDS 300-1,000 MG CAPSULE    Take 2 g by mouth once daily.    LATANOPROST 0.005 % OPHTHALMIC SOLUTION        LOSARTAN (COZAAR) 50 MG TABLET    Take 1 tablet (50 mg total) by mouth once daily.    MAGNESIUM OXIDE-MG AA CHELATE (MG-PLUS-PROTEIN) 133 MG TAB    Take by mouth.    PILOCARPINE HCL 2% (PILOCAR) 2 % OPHTHALMIC SOLUTION         PREDNISOLONE ACETATE (PRED FORTE) 1 % DRPS    PLACE 1 DROP INTO LEFT EYE EVERY 2 HOURS    PROLENSA 0.07 % DROP    PLACE 1 DROP INTO LEFT EYE EVERY DAY    PSYLLIUM 0.52 GRAM CAPSULE    Take 0.52 g by mouth once daily.    TAMSULOSIN (FLOMAX) 0.4 MG CAP    TAKE 2 CAPSULES NIGHTLY   Discontinued Medications    VERAPAMIL (VERELAN) 180 MG C24P    Take 1 capsule (180 mg total) by mouth once daily for 14 days

## 2020-12-11 ENCOUNTER — PATIENT MESSAGE (OUTPATIENT)
Dept: OTHER | Facility: OTHER | Age: 75
End: 2020-12-11

## 2021-02-17 ENCOUNTER — LAB VISIT (OUTPATIENT)
Dept: LAB | Facility: HOSPITAL | Age: 76
End: 2021-02-17
Attending: UROLOGY
Payer: MEDICARE

## 2021-02-17 DIAGNOSIS — R97.20 ELEVATED PSA: Chronic | ICD-10-CM

## 2021-02-17 LAB — COMPLEXED PSA SERPL-MCNC: 5.7 NG/ML (ref 0–4)

## 2021-02-17 PROCEDURE — 84153 ASSAY OF PSA TOTAL: CPT

## 2021-02-17 PROCEDURE — 36415 COLL VENOUS BLD VENIPUNCTURE: CPT | Mod: PO

## 2021-02-23 ENCOUNTER — PATIENT OUTREACH (OUTPATIENT)
Dept: ADMINISTRATIVE | Facility: OTHER | Age: 76
End: 2021-02-23

## 2021-02-24 ENCOUNTER — OFFICE VISIT (OUTPATIENT)
Dept: FAMILY MEDICINE | Facility: CLINIC | Age: 76
End: 2021-02-24
Payer: MEDICARE

## 2021-02-24 ENCOUNTER — OFFICE VISIT (OUTPATIENT)
Dept: UROLOGY | Facility: CLINIC | Age: 76
End: 2021-02-24
Payer: MEDICARE

## 2021-02-24 VITALS
HEIGHT: 69 IN | HEART RATE: 74 BPM | WEIGHT: 200.19 LBS | DIASTOLIC BLOOD PRESSURE: 80 MMHG | SYSTOLIC BLOOD PRESSURE: 122 MMHG | BODY MASS INDEX: 29.65 KG/M2

## 2021-02-24 VITALS
WEIGHT: 200.19 LBS | SYSTOLIC BLOOD PRESSURE: 122 MMHG | OXYGEN SATURATION: 96 % | HEART RATE: 74 BPM | BODY MASS INDEX: 29.65 KG/M2 | DIASTOLIC BLOOD PRESSURE: 80 MMHG | HEIGHT: 69 IN

## 2021-02-24 DIAGNOSIS — Z00.00 ENCOUNTER FOR PREVENTIVE HEALTH EXAMINATION: Primary | ICD-10-CM

## 2021-02-24 DIAGNOSIS — N40.1 BENIGN PROSTATIC HYPERPLASIA WITH WEAK URINARY STREAM: ICD-10-CM

## 2021-02-24 DIAGNOSIS — H61.21 IMPACTED CERUMEN, RIGHT EAR: ICD-10-CM

## 2021-02-24 DIAGNOSIS — R39.12 BENIGN PROSTATIC HYPERPLASIA WITH WEAK URINARY STREAM: ICD-10-CM

## 2021-02-24 DIAGNOSIS — R97.20 ELEVATED PSA: Primary | ICD-10-CM

## 2021-02-24 DIAGNOSIS — I10 ESSENTIAL HYPERTENSION: Chronic | ICD-10-CM

## 2021-02-24 DIAGNOSIS — N40.0 BENIGN PROSTATIC HYPERPLASIA: ICD-10-CM

## 2021-02-24 DIAGNOSIS — H91.90 DECREASED HEARING, UNSPECIFIED LATERALITY: ICD-10-CM

## 2021-02-24 DIAGNOSIS — R35.1 NOCTURIA: ICD-10-CM

## 2021-02-24 PROCEDURE — 1159F PR MEDICATION LIST DOCUMENTED IN MEDICAL RECORD: ICD-10-PCS | Mod: S$GLB,,, | Performed by: UROLOGY

## 2021-02-24 PROCEDURE — 3288F PR FALLS RISK ASSESSMENT DOCUMENTED: ICD-10-PCS | Mod: CPTII,S$GLB,, | Performed by: UROLOGY

## 2021-02-24 PROCEDURE — 1101F PR PT FALLS ASSESS DOC 0-1 FALLS W/OUT INJ PAST YR: ICD-10-PCS | Mod: CPTII,S$GLB,, | Performed by: UROLOGY

## 2021-02-24 PROCEDURE — 3008F PR BODY MASS INDEX (BMI) DOCUMENTED: ICD-10-PCS | Mod: CPTII,S$GLB,, | Performed by: UROLOGY

## 2021-02-24 PROCEDURE — 99214 PR OFFICE/OUTPT VISIT, EST, LEVL IV, 30-39 MIN: ICD-10-PCS | Mod: S$GLB,,, | Performed by: UROLOGY

## 2021-02-24 PROCEDURE — 3079F DIAST BP 80-89 MM HG: CPT | Mod: CPTII,S$GLB,, | Performed by: UROLOGY

## 2021-02-24 PROCEDURE — 3288F FALL RISK ASSESSMENT DOCD: CPT | Mod: CPTII,S$GLB,, | Performed by: UROLOGY

## 2021-02-24 PROCEDURE — 3074F SYST BP LT 130 MM HG: CPT | Mod: CPTII,S$GLB,, | Performed by: NURSE PRACTITIONER

## 2021-02-24 PROCEDURE — 1126F PR PAIN SEVERITY QUANTIFIED, NO PAIN PRESENT: ICD-10-PCS | Mod: S$GLB,,, | Performed by: NURSE PRACTITIONER

## 2021-02-24 PROCEDURE — 1126F AMNT PAIN NOTED NONE PRSNT: CPT | Mod: S$GLB,,, | Performed by: NURSE PRACTITIONER

## 2021-02-24 PROCEDURE — 3074F SYST BP LT 130 MM HG: CPT | Mod: CPTII,S$GLB,, | Performed by: UROLOGY

## 2021-02-24 PROCEDURE — 3008F BODY MASS INDEX DOCD: CPT | Mod: CPTII,S$GLB,, | Performed by: UROLOGY

## 2021-02-24 PROCEDURE — G0439 PPPS, SUBSEQ VISIT: HCPCS | Mod: S$GLB,,, | Performed by: NURSE PRACTITIONER

## 2021-02-24 PROCEDURE — 1126F PR PAIN SEVERITY QUANTIFIED, NO PAIN PRESENT: ICD-10-PCS | Mod: S$GLB,,, | Performed by: UROLOGY

## 2021-02-24 PROCEDURE — 99214 OFFICE O/P EST MOD 30 MIN: CPT | Mod: S$GLB,,, | Performed by: UROLOGY

## 2021-02-24 PROCEDURE — 1126F AMNT PAIN NOTED NONE PRSNT: CPT | Mod: S$GLB,,, | Performed by: UROLOGY

## 2021-02-24 PROCEDURE — 3288F PR FALLS RISK ASSESSMENT DOCUMENTED: ICD-10-PCS | Mod: CPTII,S$GLB,, | Performed by: NURSE PRACTITIONER

## 2021-02-24 PROCEDURE — 3079F DIAST BP 80-89 MM HG: CPT | Mod: CPTII,S$GLB,, | Performed by: NURSE PRACTITIONER

## 2021-02-24 PROCEDURE — 3074F PR MOST RECENT SYSTOLIC BLOOD PRESSURE < 130 MM HG: ICD-10-PCS | Mod: CPTII,S$GLB,, | Performed by: UROLOGY

## 2021-02-24 PROCEDURE — 99999 PR PBB SHADOW E&M-EST. PATIENT-LVL V: CPT | Mod: PBBFAC,,, | Performed by: NURSE PRACTITIONER

## 2021-02-24 PROCEDURE — 3079F PR MOST RECENT DIASTOLIC BLOOD PRESSURE 80-89 MM HG: ICD-10-PCS | Mod: CPTII,S$GLB,, | Performed by: UROLOGY

## 2021-02-24 PROCEDURE — 3008F BODY MASS INDEX DOCD: CPT | Mod: CPTII,S$GLB,, | Performed by: NURSE PRACTITIONER

## 2021-02-24 PROCEDURE — 1101F PR PT FALLS ASSESS DOC 0-1 FALLS W/OUT INJ PAST YR: ICD-10-PCS | Mod: CPTII,S$GLB,, | Performed by: NURSE PRACTITIONER

## 2021-02-24 PROCEDURE — 1159F MED LIST DOCD IN RCRD: CPT | Mod: S$GLB,,, | Performed by: UROLOGY

## 2021-02-24 PROCEDURE — 99999 PR PBB SHADOW E&M-EST. PATIENT-LVL IV: CPT | Mod: PBBFAC,,, | Performed by: UROLOGY

## 2021-02-24 PROCEDURE — 99999 PR PBB SHADOW E&M-EST. PATIENT-LVL IV: ICD-10-PCS | Mod: PBBFAC,,, | Performed by: UROLOGY

## 2021-02-24 PROCEDURE — 3074F PR MOST RECENT SYSTOLIC BLOOD PRESSURE < 130 MM HG: ICD-10-PCS | Mod: CPTII,S$GLB,, | Performed by: NURSE PRACTITIONER

## 2021-02-24 PROCEDURE — 1101F PT FALLS ASSESS-DOCD LE1/YR: CPT | Mod: CPTII,S$GLB,, | Performed by: NURSE PRACTITIONER

## 2021-02-24 PROCEDURE — 3008F PR BODY MASS INDEX (BMI) DOCUMENTED: ICD-10-PCS | Mod: CPTII,S$GLB,, | Performed by: NURSE PRACTITIONER

## 2021-02-24 PROCEDURE — 3288F FALL RISK ASSESSMENT DOCD: CPT | Mod: CPTII,S$GLB,, | Performed by: NURSE PRACTITIONER

## 2021-02-24 PROCEDURE — 3079F PR MOST RECENT DIASTOLIC BLOOD PRESSURE 80-89 MM HG: ICD-10-PCS | Mod: CPTII,S$GLB,, | Performed by: NURSE PRACTITIONER

## 2021-02-24 PROCEDURE — 1101F PT FALLS ASSESS-DOCD LE1/YR: CPT | Mod: CPTII,S$GLB,, | Performed by: UROLOGY

## 2021-02-24 PROCEDURE — 99999 PR PBB SHADOW E&M-EST. PATIENT-LVL V: ICD-10-PCS | Mod: PBBFAC,,, | Performed by: NURSE PRACTITIONER

## 2021-02-24 PROCEDURE — G0439 PR MEDICARE ANNUAL WELLNESS SUBSEQUENT VISIT: ICD-10-PCS | Mod: S$GLB,,, | Performed by: NURSE PRACTITIONER

## 2021-02-24 RX ORDER — MOXIFLOXACIN 5 MG/ML
SOLUTION/ DROPS OPHTHALMIC
COMMUNITY
Start: 2020-11-30 | End: 2023-05-18

## 2021-02-24 RX ORDER — OFLOXACIN 3 MG/ML
SOLUTION/ DROPS OPHTHALMIC
COMMUNITY
Start: 2020-11-18 | End: 2023-05-18

## 2021-02-24 RX ORDER — TAMSULOSIN HYDROCHLORIDE 0.4 MG/1
CAPSULE ORAL
Qty: 180 CAPSULE | Refills: 3 | Status: SHIPPED | OUTPATIENT
Start: 2021-02-24 | End: 2021-12-28 | Stop reason: SDUPTHER

## 2021-02-24 RX ORDER — TAMSULOSIN HYDROCHLORIDE 0.4 MG/1
CAPSULE ORAL
Qty: 180 CAPSULE | Refills: 3 | Status: SHIPPED | OUTPATIENT
Start: 2021-02-24 | End: 2021-02-24 | Stop reason: SDUPTHER

## 2021-02-24 RX ORDER — VERAPAMIL HYDROCHLORIDE 180 MG/1
CAPSULE, EXTENDED RELEASE ORAL
COMMUNITY
Start: 2021-01-14 | End: 2021-02-24 | Stop reason: ALTCHOICE

## 2021-03-08 ENCOUNTER — CLINICAL SUPPORT (OUTPATIENT)
Dept: AUDIOLOGY | Facility: CLINIC | Age: 76
End: 2021-03-08
Payer: MEDICARE

## 2021-03-08 ENCOUNTER — OFFICE VISIT (OUTPATIENT)
Dept: OTOLARYNGOLOGY | Facility: CLINIC | Age: 76
End: 2021-03-08
Payer: MEDICARE

## 2021-03-08 VITALS — WEIGHT: 196.44 LBS | BODY MASS INDEX: 29.09 KG/M2 | HEIGHT: 69 IN

## 2021-03-08 DIAGNOSIS — H90.3 BILATERAL SENSORINEURAL HEARING LOSS: ICD-10-CM

## 2021-03-08 DIAGNOSIS — H91.90 DECREASED HEARING, UNSPECIFIED LATERALITY: ICD-10-CM

## 2021-03-08 DIAGNOSIS — H61.23 BILATERAL IMPACTED CERUMEN: ICD-10-CM

## 2021-03-08 PROCEDURE — 1126F PR PAIN SEVERITY QUANTIFIED, NO PAIN PRESENT: ICD-10-PCS | Mod: S$GLB,,, | Performed by: NURSE PRACTITIONER

## 2021-03-08 PROCEDURE — 3288F PR FALLS RISK ASSESSMENT DOCUMENTED: ICD-10-PCS | Mod: CPTII,S$GLB,, | Performed by: NURSE PRACTITIONER

## 2021-03-08 PROCEDURE — 99999 PR PBB SHADOW E&M-EST. PATIENT-LVL III: ICD-10-PCS | Mod: PBBFAC,,, | Performed by: NURSE PRACTITIONER

## 2021-03-08 PROCEDURE — 99999 PR PBB SHADOW E&M-EST. PATIENT-LVL II: CPT | Mod: PBBFAC,,,

## 2021-03-08 PROCEDURE — 1126F AMNT PAIN NOTED NONE PRSNT: CPT | Mod: S$GLB,,, | Performed by: NURSE PRACTITIONER

## 2021-03-08 PROCEDURE — 99214 PR OFFICE/OUTPT VISIT, EST, LEVL IV, 30-39 MIN: ICD-10-PCS | Mod: 25,S$GLB,, | Performed by: NURSE PRACTITIONER

## 2021-03-08 PROCEDURE — 1159F PR MEDICATION LIST DOCUMENTED IN MEDICAL RECORD: ICD-10-PCS | Mod: S$GLB,,, | Performed by: NURSE PRACTITIONER

## 2021-03-08 PROCEDURE — 99214 OFFICE O/P EST MOD 30 MIN: CPT | Mod: 25,S$GLB,, | Performed by: NURSE PRACTITIONER

## 2021-03-08 PROCEDURE — 99999 PR PBB SHADOW E&M-EST. PATIENT-LVL III: CPT | Mod: PBBFAC,,, | Performed by: NURSE PRACTITIONER

## 2021-03-08 PROCEDURE — 3288F FALL RISK ASSESSMENT DOCD: CPT | Mod: CPTII,S$GLB,, | Performed by: NURSE PRACTITIONER

## 2021-03-08 PROCEDURE — 1101F PR PT FALLS ASSESS DOC 0-1 FALLS W/OUT INJ PAST YR: ICD-10-PCS | Mod: CPTII,S$GLB,, | Performed by: NURSE PRACTITIONER

## 2021-03-08 PROCEDURE — 3008F PR BODY MASS INDEX (BMI) DOCUMENTED: ICD-10-PCS | Mod: CPTII,S$GLB,, | Performed by: NURSE PRACTITIONER

## 2021-03-08 PROCEDURE — 3008F BODY MASS INDEX DOCD: CPT | Mod: CPTII,S$GLB,, | Performed by: NURSE PRACTITIONER

## 2021-03-08 PROCEDURE — 1101F PT FALLS ASSESS-DOCD LE1/YR: CPT | Mod: CPTII,S$GLB,, | Performed by: NURSE PRACTITIONER

## 2021-03-08 PROCEDURE — 99999 PR PBB SHADOW E&M-EST. PATIENT-LVL II: ICD-10-PCS | Mod: PBBFAC,,,

## 2021-03-08 PROCEDURE — 92557 PR COMPREHENSIVE HEARING TEST: ICD-10-PCS | Mod: S$GLB,,, | Performed by: AUDIOLOGIST-HEARING AID FITTER

## 2021-03-08 PROCEDURE — 92557 COMPREHENSIVE HEARING TEST: CPT | Mod: S$GLB,,, | Performed by: AUDIOLOGIST-HEARING AID FITTER

## 2021-03-08 PROCEDURE — G0268 PR REMOVAL OF IMPACTED WAX MD: ICD-10-PCS | Mod: S$GLB,,, | Performed by: NURSE PRACTITIONER

## 2021-03-08 PROCEDURE — G0268 REMOVAL OF IMPACTED WAX MD: HCPCS | Mod: S$GLB,,, | Performed by: NURSE PRACTITIONER

## 2021-03-08 PROCEDURE — 1159F MED LIST DOCD IN RCRD: CPT | Mod: S$GLB,,, | Performed by: NURSE PRACTITIONER

## 2021-05-24 DIAGNOSIS — I10 ESSENTIAL HYPERTENSION: Chronic | ICD-10-CM

## 2021-05-31 RX ORDER — LOSARTAN POTASSIUM 50 MG/1
50 TABLET ORAL DAILY
Qty: 30 TABLET | Refills: 0 | Status: SHIPPED | OUTPATIENT
Start: 2021-05-31 | End: 2021-07-19 | Stop reason: SDUPTHER

## 2021-05-31 RX ORDER — LOSARTAN POTASSIUM 50 MG/1
50 TABLET ORAL DAILY
Qty: 90 TABLET | Refills: 0 | Status: SHIPPED | OUTPATIENT
Start: 2021-05-31 | End: 2021-07-19 | Stop reason: SDUPTHER

## 2021-06-02 ENCOUNTER — TELEPHONE (OUTPATIENT)
Dept: FAMILY MEDICINE | Facility: CLINIC | Age: 76
End: 2021-06-02

## 2021-06-14 ENCOUNTER — OFFICE VISIT (OUTPATIENT)
Dept: FAMILY MEDICINE | Facility: CLINIC | Age: 76
End: 2021-06-14
Payer: MEDICARE

## 2021-06-14 ENCOUNTER — LAB VISIT (OUTPATIENT)
Dept: LAB | Facility: HOSPITAL | Age: 76
End: 2021-06-14
Attending: EMERGENCY MEDICINE
Payer: MEDICARE

## 2021-06-14 VITALS
SYSTOLIC BLOOD PRESSURE: 138 MMHG | HEIGHT: 69 IN | DIASTOLIC BLOOD PRESSURE: 84 MMHG | HEART RATE: 61 BPM | WEIGHT: 198.44 LBS | BODY MASS INDEX: 29.39 KG/M2 | OXYGEN SATURATION: 98 %

## 2021-06-14 DIAGNOSIS — R97.20 ELEVATED PSA: ICD-10-CM

## 2021-06-14 DIAGNOSIS — I10 ESSENTIAL HYPERTENSION: Primary | ICD-10-CM

## 2021-06-14 DIAGNOSIS — R41.3 MEMORY CHANGE: ICD-10-CM

## 2021-06-14 DIAGNOSIS — I10 ESSENTIAL HYPERTENSION: ICD-10-CM

## 2021-06-14 LAB
BASOPHILS # BLD AUTO: 0.03 K/UL (ref 0–0.2)
BASOPHILS NFR BLD: 0.7 % (ref 0–1.9)
DIFFERENTIAL METHOD: ABNORMAL
EOSINOPHIL # BLD AUTO: 0.2 K/UL (ref 0–0.5)
EOSINOPHIL NFR BLD: 5.4 % (ref 0–8)
ERYTHROCYTE [DISTWIDTH] IN BLOOD BY AUTOMATED COUNT: 13.6 % (ref 11.5–14.5)
HCT VFR BLD AUTO: 37.4 % (ref 40–54)
HGB BLD-MCNC: 12.1 G/DL (ref 14–18)
IMM GRANULOCYTES # BLD AUTO: 0.01 K/UL (ref 0–0.04)
IMM GRANULOCYTES NFR BLD AUTO: 0.2 % (ref 0–0.5)
LYMPHOCYTES # BLD AUTO: 1.4 K/UL (ref 1–4.8)
LYMPHOCYTES NFR BLD: 31.8 % (ref 18–48)
MCH RBC QN AUTO: 29.7 PG (ref 27–31)
MCHC RBC AUTO-ENTMCNC: 32.4 G/DL (ref 32–36)
MCV RBC AUTO: 92 FL (ref 82–98)
MONOCYTES # BLD AUTO: 0.4 K/UL (ref 0.3–1)
MONOCYTES NFR BLD: 9.4 % (ref 4–15)
NEUTROPHILS # BLD AUTO: 2.2 K/UL (ref 1.8–7.7)
NEUTROPHILS NFR BLD: 52.5 % (ref 38–73)
NRBC BLD-RTO: 0 /100 WBC
PLATELET # BLD AUTO: 162 K/UL (ref 150–450)
PMV BLD AUTO: 12.3 FL (ref 9.2–12.9)
RBC # BLD AUTO: 4.08 M/UL (ref 4.6–6.2)
WBC # BLD AUTO: 4.25 K/UL (ref 3.9–12.7)

## 2021-06-14 PROCEDURE — 99999 PR PBB SHADOW E&M-EST. PATIENT-LVL IV: ICD-10-PCS | Mod: PBBFAC,HCNC,, | Performed by: EMERGENCY MEDICINE

## 2021-06-14 PROCEDURE — 1159F MED LIST DOCD IN RCRD: CPT | Mod: HCNC,S$GLB,, | Performed by: EMERGENCY MEDICINE

## 2021-06-14 PROCEDURE — 3288F PR FALLS RISK ASSESSMENT DOCUMENTED: ICD-10-PCS | Mod: HCNC,CPTII,S$GLB, | Performed by: EMERGENCY MEDICINE

## 2021-06-14 PROCEDURE — 1126F PR PAIN SEVERITY QUANTIFIED, NO PAIN PRESENT: ICD-10-PCS | Mod: HCNC,S$GLB,, | Performed by: EMERGENCY MEDICINE

## 2021-06-14 PROCEDURE — 3008F BODY MASS INDEX DOCD: CPT | Mod: HCNC,CPTII,S$GLB, | Performed by: EMERGENCY MEDICINE

## 2021-06-14 PROCEDURE — 80053 COMPREHEN METABOLIC PANEL: CPT | Mod: HCNC | Performed by: EMERGENCY MEDICINE

## 2021-06-14 PROCEDURE — 1101F PR PT FALLS ASSESS DOC 0-1 FALLS W/OUT INJ PAST YR: ICD-10-PCS | Mod: HCNC,CPTII,S$GLB, | Performed by: EMERGENCY MEDICINE

## 2021-06-14 PROCEDURE — 3079F DIAST BP 80-89 MM HG: CPT | Mod: HCNC,CPTII,S$GLB, | Performed by: EMERGENCY MEDICINE

## 2021-06-14 PROCEDURE — 99214 PR OFFICE/OUTPT VISIT, EST, LEVL IV, 30-39 MIN: ICD-10-PCS | Mod: HCNC,S$GLB,, | Performed by: EMERGENCY MEDICINE

## 2021-06-14 PROCEDURE — 99499 UNLISTED E&M SERVICE: CPT | Mod: S$GLB,,, | Performed by: EMERGENCY MEDICINE

## 2021-06-14 PROCEDURE — 3075F SYST BP GE 130 - 139MM HG: CPT | Mod: HCNC,CPTII,S$GLB, | Performed by: EMERGENCY MEDICINE

## 2021-06-14 PROCEDURE — 3288F FALL RISK ASSESSMENT DOCD: CPT | Mod: HCNC,CPTII,S$GLB, | Performed by: EMERGENCY MEDICINE

## 2021-06-14 PROCEDURE — 3079F PR MOST RECENT DIASTOLIC BLOOD PRESSURE 80-89 MM HG: ICD-10-PCS | Mod: HCNC,CPTII,S$GLB, | Performed by: EMERGENCY MEDICINE

## 2021-06-14 PROCEDURE — 84153 ASSAY OF PSA TOTAL: CPT | Mod: HCNC | Performed by: EMERGENCY MEDICINE

## 2021-06-14 PROCEDURE — 99999 PR PBB SHADOW E&M-EST. PATIENT-LVL IV: CPT | Mod: PBBFAC,HCNC,, | Performed by: EMERGENCY MEDICINE

## 2021-06-14 PROCEDURE — 3075F PR MOST RECENT SYSTOLIC BLOOD PRESS GE 130-139MM HG: ICD-10-PCS | Mod: HCNC,CPTII,S$GLB, | Performed by: EMERGENCY MEDICINE

## 2021-06-14 PROCEDURE — 99214 OFFICE O/P EST MOD 30 MIN: CPT | Mod: HCNC,S$GLB,, | Performed by: EMERGENCY MEDICINE

## 2021-06-14 PROCEDURE — 3008F PR BODY MASS INDEX (BMI) DOCUMENTED: ICD-10-PCS | Mod: HCNC,CPTII,S$GLB, | Performed by: EMERGENCY MEDICINE

## 2021-06-14 PROCEDURE — 85025 COMPLETE CBC W/AUTO DIFF WBC: CPT | Mod: HCNC | Performed by: EMERGENCY MEDICINE

## 2021-06-14 PROCEDURE — 1126F AMNT PAIN NOTED NONE PRSNT: CPT | Mod: HCNC,S$GLB,, | Performed by: EMERGENCY MEDICINE

## 2021-06-14 PROCEDURE — 1159F PR MEDICATION LIST DOCUMENTED IN MEDICAL RECORD: ICD-10-PCS | Mod: HCNC,S$GLB,, | Performed by: EMERGENCY MEDICINE

## 2021-06-14 PROCEDURE — 1101F PT FALLS ASSESS-DOCD LE1/YR: CPT | Mod: HCNC,CPTII,S$GLB, | Performed by: EMERGENCY MEDICINE

## 2021-06-14 PROCEDURE — 99499 RISK ADDL DX/OHS AUDIT: ICD-10-PCS | Mod: S$GLB,,, | Performed by: EMERGENCY MEDICINE

## 2021-06-14 PROCEDURE — 36415 COLL VENOUS BLD VENIPUNCTURE: CPT | Mod: HCNC,PO | Performed by: EMERGENCY MEDICINE

## 2021-06-15 LAB
ALBUMIN SERPL BCP-MCNC: 3.7 G/DL (ref 3.5–5.2)
ALP SERPL-CCNC: 67 U/L (ref 55–135)
ALT SERPL W/O P-5'-P-CCNC: 11 U/L (ref 10–44)
ANION GAP SERPL CALC-SCNC: 10 MMOL/L (ref 8–16)
AST SERPL-CCNC: 18 U/L (ref 10–40)
BILIRUB SERPL-MCNC: 0.5 MG/DL (ref 0.1–1)
BUN SERPL-MCNC: 18 MG/DL (ref 8–23)
CALCIUM SERPL-MCNC: 9.1 MG/DL (ref 8.7–10.5)
CHLORIDE SERPL-SCNC: 109 MMOL/L (ref 95–110)
CO2 SERPL-SCNC: 22 MMOL/L (ref 23–29)
COMPLEXED PSA SERPL-MCNC: 5.6 NG/ML (ref 0–4)
CREAT SERPL-MCNC: 0.9 MG/DL (ref 0.5–1.4)
EST. GFR  (AFRICAN AMERICAN): >60 ML/MIN/1.73 M^2
EST. GFR  (NON AFRICAN AMERICAN): >60 ML/MIN/1.73 M^2
GLUCOSE SERPL-MCNC: 87 MG/DL (ref 70–110)
POTASSIUM SERPL-SCNC: 4.3 MMOL/L (ref 3.5–5.1)
PROT SERPL-MCNC: 7.3 G/DL (ref 6–8.4)
SODIUM SERPL-SCNC: 141 MMOL/L (ref 136–145)

## 2021-07-15 DIAGNOSIS — I10 ESSENTIAL HYPERTENSION: Primary | ICD-10-CM

## 2021-07-19 RX ORDER — LOSARTAN POTASSIUM 50 MG/1
TABLET ORAL
Qty: 90 TABLET | Refills: 3 | Status: SHIPPED | OUTPATIENT
Start: 2021-07-19 | End: 2021-10-01

## 2021-07-19 RX ORDER — VERAPAMIL HYDROCHLORIDE 180 MG/1
CAPSULE, EXTENDED RELEASE ORAL
Qty: 90 CAPSULE | Refills: 3 | Status: SHIPPED | OUTPATIENT
Start: 2021-07-19 | End: 2023-05-18

## 2021-09-29 DIAGNOSIS — I10 ESSENTIAL HYPERTENSION: ICD-10-CM

## 2021-10-01 RX ORDER — LOSARTAN POTASSIUM 50 MG/1
50 TABLET ORAL DAILY
Qty: 90 TABLET | Refills: 2 | Status: SHIPPED | OUTPATIENT
Start: 2021-10-01 | End: 2021-12-28 | Stop reason: SDUPTHER

## 2021-10-21 ENCOUNTER — IMMUNIZATION (OUTPATIENT)
Dept: FAMILY MEDICINE | Facility: CLINIC | Age: 76
End: 2021-10-21
Payer: MEDICARE

## 2021-10-21 DIAGNOSIS — Z23 NEED FOR VACCINATION: Primary | ICD-10-CM

## 2021-10-21 PROCEDURE — 0003A COVID-19, MRNA, LNP-S, PF, 30 MCG/0.3 ML DOSE VACCINE: CPT | Mod: HCNC,PBBFAC | Performed by: FAMILY MEDICINE

## 2021-10-21 PROCEDURE — 91300 COVID-19, MRNA, LNP-S, PF, 30 MCG/0.3 ML DOSE VACCINE: CPT | Mod: HCNC,PBBFAC | Performed by: FAMILY MEDICINE

## 2021-12-28 ENCOUNTER — TELEPHONE (OUTPATIENT)
Dept: FAMILY MEDICINE | Facility: CLINIC | Age: 76
End: 2021-12-28
Payer: MEDICARE

## 2021-12-28 DIAGNOSIS — N40.0 BENIGN PROSTATIC HYPERPLASIA: ICD-10-CM

## 2021-12-28 DIAGNOSIS — I10 ESSENTIAL HYPERTENSION: ICD-10-CM

## 2021-12-28 RX ORDER — TAMSULOSIN HYDROCHLORIDE 0.4 MG/1
CAPSULE ORAL
Qty: 60 CAPSULE | Refills: 0 | Status: SHIPPED | OUTPATIENT
Start: 2021-12-28 | End: 2022-01-24

## 2021-12-28 RX ORDER — LOSARTAN POTASSIUM 50 MG/1
50 TABLET ORAL DAILY
Qty: 30 TABLET | Refills: 0 | Status: SHIPPED | OUTPATIENT
Start: 2021-12-28 | End: 2022-08-05

## 2022-01-04 ENCOUNTER — TELEPHONE (OUTPATIENT)
Dept: FAMILY MEDICINE | Facility: CLINIC | Age: 77
End: 2022-01-04
Payer: MEDICARE

## 2022-03-15 ENCOUNTER — OFFICE VISIT (OUTPATIENT)
Dept: FAMILY MEDICINE | Facility: CLINIC | Age: 77
End: 2022-03-15
Payer: MEDICARE

## 2022-03-15 VITALS
DIASTOLIC BLOOD PRESSURE: 70 MMHG | WEIGHT: 198.63 LBS | HEIGHT: 69 IN | OXYGEN SATURATION: 99 % | SYSTOLIC BLOOD PRESSURE: 128 MMHG | RESPIRATION RATE: 18 BRPM | BODY MASS INDEX: 29.42 KG/M2 | HEART RATE: 75 BPM

## 2022-03-15 DIAGNOSIS — I10 ESSENTIAL HYPERTENSION: Primary | Chronic | ICD-10-CM

## 2022-03-15 DIAGNOSIS — R41.3 MEMORY CHANGES: ICD-10-CM

## 2022-03-15 DIAGNOSIS — H40.9 GLAUCOMA, UNSPECIFIED GLAUCOMA TYPE, UNSPECIFIED LATERALITY: Chronic | ICD-10-CM

## 2022-03-15 DIAGNOSIS — R97.20 ELEVATED PSA: Chronic | ICD-10-CM

## 2022-03-15 PROCEDURE — 3288F PR FALLS RISK ASSESSMENT DOCUMENTED: ICD-10-PCS | Mod: CPTII,S$GLB,, | Performed by: EMERGENCY MEDICINE

## 2022-03-15 PROCEDURE — 3074F PR MOST RECENT SYSTOLIC BLOOD PRESSURE < 130 MM HG: ICD-10-PCS | Mod: CPTII,S$GLB,, | Performed by: EMERGENCY MEDICINE

## 2022-03-15 PROCEDURE — 3008F PR BODY MASS INDEX (BMI) DOCUMENTED: ICD-10-PCS | Mod: CPTII,S$GLB,, | Performed by: EMERGENCY MEDICINE

## 2022-03-15 PROCEDURE — 1126F PR PAIN SEVERITY QUANTIFIED, NO PAIN PRESENT: ICD-10-PCS | Mod: CPTII,S$GLB,, | Performed by: EMERGENCY MEDICINE

## 2022-03-15 PROCEDURE — 99214 OFFICE O/P EST MOD 30 MIN: CPT | Mod: S$GLB,,, | Performed by: EMERGENCY MEDICINE

## 2022-03-15 PROCEDURE — 99999 PR PBB SHADOW E&M-EST. PATIENT-LVL III: CPT | Mod: PBBFAC,,, | Performed by: EMERGENCY MEDICINE

## 2022-03-15 PROCEDURE — 3008F BODY MASS INDEX DOCD: CPT | Mod: CPTII,S$GLB,, | Performed by: EMERGENCY MEDICINE

## 2022-03-15 PROCEDURE — 1126F AMNT PAIN NOTED NONE PRSNT: CPT | Mod: CPTII,S$GLB,, | Performed by: EMERGENCY MEDICINE

## 2022-03-15 PROCEDURE — 99214 PR OFFICE/OUTPT VISIT, EST, LEVL IV, 30-39 MIN: ICD-10-PCS | Mod: S$GLB,,, | Performed by: EMERGENCY MEDICINE

## 2022-03-15 PROCEDURE — 1101F PR PT FALLS ASSESS DOC 0-1 FALLS W/OUT INJ PAST YR: ICD-10-PCS | Mod: CPTII,S$GLB,, | Performed by: EMERGENCY MEDICINE

## 2022-03-15 PROCEDURE — 3078F DIAST BP <80 MM HG: CPT | Mod: CPTII,S$GLB,, | Performed by: EMERGENCY MEDICINE

## 2022-03-15 PROCEDURE — 1159F MED LIST DOCD IN RCRD: CPT | Mod: CPTII,S$GLB,, | Performed by: EMERGENCY MEDICINE

## 2022-03-15 PROCEDURE — 3078F PR MOST RECENT DIASTOLIC BLOOD PRESSURE < 80 MM HG: ICD-10-PCS | Mod: CPTII,S$GLB,, | Performed by: EMERGENCY MEDICINE

## 2022-03-15 PROCEDURE — 3074F SYST BP LT 130 MM HG: CPT | Mod: CPTII,S$GLB,, | Performed by: EMERGENCY MEDICINE

## 2022-03-15 PROCEDURE — 1159F PR MEDICATION LIST DOCUMENTED IN MEDICAL RECORD: ICD-10-PCS | Mod: CPTII,S$GLB,, | Performed by: EMERGENCY MEDICINE

## 2022-03-15 PROCEDURE — 1101F PT FALLS ASSESS-DOCD LE1/YR: CPT | Mod: CPTII,S$GLB,, | Performed by: EMERGENCY MEDICINE

## 2022-03-15 PROCEDURE — 3288F FALL RISK ASSESSMENT DOCD: CPT | Mod: CPTII,S$GLB,, | Performed by: EMERGENCY MEDICINE

## 2022-03-15 PROCEDURE — 99999 PR PBB SHADOW E&M-EST. PATIENT-LVL III: ICD-10-PCS | Mod: PBBFAC,,, | Performed by: EMERGENCY MEDICINE

## 2022-03-15 RX ORDER — FLUOROMETHOLONE 1 MG/ML
1 SUSPENSION/ DROPS OPHTHALMIC NIGHTLY
COMMUNITY
Start: 2021-12-25

## 2022-03-15 NOTE — PROGRESS NOTES
Subjective:   THIS NOTE IS DONE WITH VOICE RECOGNITION        Patient ID: Jayro Mattson is a 76 y.o. male.    Chief Complaint: Preventative Heatlh Care and Memory check       Assessment:       1. Essential hypertension    2. Elevated PSA    3. Glaucoma, unspecified glaucoma type, unspecified laterality    4. Memory changes        Plan:       1. Essential hypertension  Controlled.  Continue current medications  Continue to avoid excessive sodium  Continue home monitoring  Target blood pressure is 139/89 or less  Comprehensive metabolic panel in June    2. Elevated PSA  Stable  Continue to monitor, will do so in June of this year.  Diagnostic PSA ordered  No retention issues.    3. Glaucoma, unspecified glaucoma type, unspecified laterality  Difficulty with reading  Able to continue to function  Following closely with Ophthalmology    4. Memory changes  29/30 on mini-mental status exam today  Anticipate repeating in 6 months.  No medications added.  He expresses concerns with memory issues, as he sees his delay in treating his glaucoma with significant disabilities.        HPI     His primary reason for coming in today is 4 millimeters recheck.  He has been experiencing some sort term memory lapses.  No major issues.  He is back at my request to do a formal mini-mental status exam.    Here to follow up on blood pressure.  Taking current medications.  He is not experiencing new cardiovascular symptomatology.    BP Readings from Last 3 Encounters:   03/15/22 128/70   06/14/21 138/84   02/24/21 122/80     He does have will recognized prostatic hypertrophy.  He also has mildly elevated PSA.  No hematuria.  No urinary retention.     Latest Reference Range & Units 08/07/18 07:49 05/21/19 15:41 07/28/20 10:36 02/17/21 10:39 06/14/21 15:37   PSA, Screen 0.00 - 4.00 ng/mL   6.5 (H) [1]     PSA Diagnostic 0.00 - 4.00 ng/mL 6.0 (H) [2] 6.1 (H) [3]  5.7 (H) [4] 5.6 (H) [5]           Immunization History   Administered Date(s)  Administered    COVID-19, MRNA, LN-S, PF (Pfizer) (Purple Cap) 02/02/2021, 10/21/2021    Influenza (FLUAD) - Quadrivalent - Adjuvanted - PF *Preferred* (65+) 09/22/2020    Influenza - High Dose - PF (65 years and older) 11/14/2014, 10/18/2016, 11/20/2018, 09/12/2019    Influenza Split 12/08/2009, 09/28/2010, 02/10/2012    Pneumococcal Conjugate - 13 Valent 10/18/2016    Pneumococcal Polysaccharide - 23 Valent 05/06/2014    Zoster 11/14/2014     COVID booster recommended.    Current Outpatient Medications   Medication Sig Dispense Refill    brimonidine (ALPHAGAN) 0.2 % ophthalmic solution Place 1 drop into both eyes 3 (three) times daily. Three times a day      cholecalciferol, vitamin D3, (VITAMIN D-3) 1,000 unit Chew Take 1 tablet by mouth once daily. Every day      coenzyme Q10 (CO Q-10) 100 mg capsule Take 1 capsule by mouth Daily. Every day      dorzolamide-timolol (COSOPT) 2-0.5 % ophthalmic solution Place 1 drop into both eyes 2 (two) times daily. Three times a day      DUREZOL 0.05 % Drop ophthalmic solution       fish oil-omega-3 fatty acids 300-1,000 mg capsule Take 2 g by mouth once daily.      latanoprost 0.005 % ophthalmic solution       losartan (COZAAR) 50 MG tablet Take 1 tablet (50 mg total) by mouth once daily. 30 tablet 0    magnesium oxide-Mg AA chelate (MG-PLUS-PROTEIN) 133 mg Tab Take by mouth.      moxifloxacin (VIGAMOX) 0.5 % ophthalmic solution       ofloxacin (OCUFLOX) 0.3 % ophthalmic solution INSTILL 1 DROP INTO AFFECTED EYE 4 TIMES A DAY      pilocarpine HCL 2% (PILOCAR) 2 % ophthalmic solution       prednisoLONE acetate (PRED FORTE) 1 % DrpS PLACE 1 DROP INTO LEFT EYE EVERY 2 HOURS      PROLENSA 0.07 % Drop PLACE 1 DROP INTO LEFT EYE EVERY DAY      psyllium 0.52 gram capsule Take 0.52 g by mouth once daily.      tamsulosin (FLOMAX) 0.4 mg Cap TAKE 2 CAPSULES BY MOUTH EVERY DAY AT NIGHT 60 capsule 0    verapamiL (VERELAN) 180 MG C24P TAKE 1 CAPSULE EVERY DAY 90  capsule 3     No current facility-administered medications for this visit.         Review of Systems   Constitutional: Negative for activity change, chills, fever and unexpected weight change.   HENT: Negative for hearing loss, nosebleeds and tinnitus.    Eyes: Positive for visual disturbance (Chronic, secondary to glaucoma). Negative for discharge and itching.   Respiratory: Negative for cough, choking, chest tightness, shortness of breath and wheezing.    Cardiovascular: Negative for chest pain, palpitations and leg swelling.   Gastrointestinal: Negative for abdominal distention.   Genitourinary: Negative for difficulty urinating, dysuria, flank pain, hematuria and urgency.   Musculoskeletal: Negative for arthralgias, back pain and joint swelling.   Neurological: Negative for dizziness, tremors, seizures, numbness and headaches.   Psychiatric/Behavioral: Negative for confusion, dysphoric mood and hallucinations.        Concerned about memory issues       Objective:      Physical Exam  Vitals reviewed.   Constitutional:       General: He is not in acute distress.     Appearance: Normal appearance. He is well-developed and normal weight. He is not ill-appearing.   HENT:      Head: Normocephalic and atraumatic.      Right Ear: External ear normal.      Left Ear: External ear normal.      Nose: Nose normal.      Mouth/Throat:      Pharynx: No oropharyngeal exudate.   Eyes:      General: No scleral icterus.     Pupils: Pupils are equal, round, and reactive to light.      Comments: Significant bilateral conjunctiva all erythema.  Consistent with this history of end-stage glaucoma.  This is not new.   Neck:      Thyroid: No thyromegaly.   Cardiovascular:      Rate and Rhythm: Normal rate and regular rhythm.      Pulses: Normal pulses.      Heart sounds: Normal heart sounds. No murmur heard.  Pulmonary:      Effort: Pulmonary effort is normal.      Breath sounds: Normal breath sounds. No wheezing or rales.   Abdominal:     "  General: Bowel sounds are normal. There is no distension.      Palpations: Abdomen is soft.      Tenderness: There is no abdominal tenderness.   Musculoskeletal:         General: No tenderness. Normal range of motion.      Cervical back: Normal range of motion and neck supple.   Lymphadenopathy:      Cervical: No cervical adenopathy.   Skin:     General: Skin is warm and dry.      Capillary Refill: Capillary refill takes less than 2 seconds.      Findings: No rash.   Neurological:      Mental Status: He is alert and oriented to person, place, and time.      Motor: No abnormal muscle tone.      Coordination: Coordination normal.      Deep Tendon Reflexes: Reflexes are normal and symmetric.   Psychiatric:         Mood and Affect: Mood normal.         Behavior: Behavior normal.           MINI-MENTAL STATE EXAM    What is the (year), (season), (date), (day), (month)?5 points  Where are we (state), (parish), (town), (hospital), (floor)? 5 points  Name 3 objects: 1 second to say each, then ask the patient to repeat all three after you have said them.  Repeat initially until he/she learns all three. 3 points  Serial 7's. 1 point for each answer, stop after 5.  Alternatively, spell "world" backwards.  Must be in the correct sequence.  4 points  Show 2 common objects (pencil and watch).  Ask patient to name. 2 points  Ask the patient to repeat No ifs, ands or buts. 1 point  Follow a 3 stage command: "Take this paper in your right hand, fold it in half, and put it on the floor". 3 points  Read and obey: "Close your eyes". 1 poinr  Ask the patient to recall the three words you previously asked. 3 points  Give pt. paper and ask to write a sentence. 1 point  Show pt. drawing of intersecting pentagons. Ask pt. to draw. 1 point  What was the mini mental exam score? 29 /30  Level of consciousness: alert  Describe if abnormal:   Fund of knowledge: Normal  General appearance: Normal        "

## 2022-07-29 ENCOUNTER — IMMUNIZATION (OUTPATIENT)
Dept: FAMILY MEDICINE | Facility: CLINIC | Age: 77
End: 2022-07-29
Payer: MEDICARE

## 2022-07-29 DIAGNOSIS — Z23 NEED FOR VACCINATION: Primary | ICD-10-CM

## 2022-07-29 PROCEDURE — 91305 COVID-19, MRNA, LNP-S, PF, 30 MCG/0.3 ML DOSE VACCINE (PFIZER): CPT | Mod: PBBFAC | Performed by: RADIOLOGY

## 2022-08-04 DIAGNOSIS — I10 ESSENTIAL HYPERTENSION: ICD-10-CM

## 2022-08-04 NOTE — TELEPHONE ENCOUNTER
Refill Routing Note   Medication(s) are not appropriate for processing by [facility] for the following reason(s):      - Required laboratory values are outdated    [facility] action(s):  Defer          Medication reconciliation completed: No     Appointments  past 12m or future 3m with PCP    Date Provider   Last Visit   3/15/2022 Franko Salas Jr., MD   Next Visit   Visit date not found Franko Salas Jr., MD   ED visits in past 90 days: 0        Note composed:3:26 PM 08/04/2022

## 2022-08-04 NOTE — TELEPHONE ENCOUNTER
No new care gaps identified.  [facility] Embedded Care Gaps. Reference number: [id number]. 8/04/2022   2:30:34 AM CDT

## 2022-08-05 RX ORDER — LOSARTAN POTASSIUM 50 MG/1
TABLET ORAL
Qty: 90 TABLET | Refills: 3 | Status: SHIPPED | OUTPATIENT
Start: 2022-08-05 | End: 2023-10-19 | Stop reason: SDUPTHER

## 2022-08-23 DIAGNOSIS — N40.0 BENIGN PROSTATIC HYPERPLASIA: ICD-10-CM

## 2022-08-23 RX ORDER — TAMSULOSIN HYDROCHLORIDE 0.4 MG/1
0.8 CAPSULE ORAL DAILY
Qty: 180 CAPSULE | Refills: 3 | OUTPATIENT
Start: 2022-08-23

## 2022-08-23 NOTE — TELEPHONE ENCOUNTER
----- Message from Saw Garcia sent at 8/23/2022  2:58 PM CDT -----  Type:  RX Refill Request    Who Called:  Pt  Refill or New Rx:  refill  RX Name and Strength:  tamsulosin (FLOMAX) 0.4 mg Cap    How is the patient currently taking it? (ex. 1XDay):  as directed  Is this a 30 day or 90 day RX:  30    Preferred Pharmacy with phone number:      [facility]/pharmacy #[id number] - [place] - [address] [facility]  [address] [place] [address]  Phone: [phone] Fax: [phone]    Local or Mail Order:  Local  Ordering Provider:  Josue  Best Call Back Number:  [phone]    Additional Information:  sts Dr Salas was supposed to send the reg RX to [facility] and then a 30 day to [facility] to hold him over however both went to [facility].  Pt is out of his rx.   Please advise -- Thank you

## 2022-08-23 NOTE — TELEPHONE ENCOUNTER
Care Due:                  Date            Visit Type   Department     Provider  --------------------------------------------------------------------------------                                EP -                              PRIMARY      [facility] FAMILY  Last Visit: 03-      CARE ([facility])   MEDICINE       ELIZABETH STILES  Next Visit: None Scheduled  None         None Found                                                            Last  Test          Frequency    Reason                     Performed    Due Date  --------------------------------------------------------------------------------    CMP.........  12 months..  losartan.................  06-   06-    [facility] Embedded Care Gaps. Reference number: [id number]. 8/23/2022   3:41:27 PM CDT

## 2022-08-24 DIAGNOSIS — N40.0 BENIGN PROSTATIC HYPERPLASIA: ICD-10-CM

## 2022-08-24 NOTE — TELEPHONE ENCOUNTER
The short supply was sent to the wrong pharmacy. Can you resend it please?     The 3 month supply was already sent to [facility].    Please advise in  absence

## 2022-08-24 NOTE — TELEPHONE ENCOUNTER
----- Message from Ywa Mccray MA sent at 8/24/2022  2:25 PM CDT -----  Contact: KELLY STARKS [[id number]]  Type: Needs Medical Advice    Who Called:KELLY STARKS [[id number]]  Best Call Back Number: [phone]  Inquiry/Question: Please call KELLY STARKS [[id number]] regarding medication refill concerns      Thank you~

## 2022-08-24 NOTE — TELEPHONE ENCOUNTER
No new care gaps identified.  [facility] Embedded Care Gaps. Reference number: [id number]. 8/24/2022   2:57:20 PM CDT

## 2022-08-25 RX ORDER — TAMSULOSIN HYDROCHLORIDE 0.4 MG/1
0.8 CAPSULE ORAL DAILY
Qty: 30 CAPSULE | Refills: 0 | Status: SHIPPED | OUTPATIENT
Start: 2022-08-25 | End: 2022-12-14 | Stop reason: SDUPTHER

## 2022-09-27 ENCOUNTER — IMMUNIZATION (OUTPATIENT)
Dept: FAMILY MEDICINE | Facility: CLINIC | Age: 77
End: 2022-09-27
Payer: MEDICARE

## 2022-09-27 PROCEDURE — 90694 FLU VACCINE - QUADRIVALENT - ADJUVANTED: ICD-10-PCS | Mod: S$GLB,,, | Performed by: EMERGENCY MEDICINE

## 2022-09-27 PROCEDURE — G0008 FLU VACCINE - QUADRIVALENT - ADJUVANTED: ICD-10-PCS | Mod: S$GLB,,, | Performed by: EMERGENCY MEDICINE

## 2022-09-27 PROCEDURE — G0008 ADMIN INFLUENZA VIRUS VAC: HCPCS | Mod: S$GLB,,, | Performed by: EMERGENCY MEDICINE

## 2022-09-27 PROCEDURE — 90694 VACC AIIV4 NO PRSRV 0.5ML IM: CPT | Mod: S$GLB,,, | Performed by: EMERGENCY MEDICINE

## 2022-11-17 ENCOUNTER — PES CALL (OUTPATIENT)
Dept: ADMINISTRATIVE | Facility: CLINIC | Age: 77
End: 2022-11-17
Payer: MEDICARE

## 2022-12-13 ENCOUNTER — TELEPHONE (OUTPATIENT)
Dept: ADMINISTRATIVE | Facility: CLINIC | Age: 77
End: 2022-12-13
Payer: MEDICARE

## 2022-12-14 ENCOUNTER — OFFICE VISIT (OUTPATIENT)
Dept: FAMILY MEDICINE | Facility: CLINIC | Age: 77
End: 2022-12-14
Payer: MEDICARE

## 2022-12-14 VITALS
DIASTOLIC BLOOD PRESSURE: 74 MMHG | SYSTOLIC BLOOD PRESSURE: 126 MMHG | HEART RATE: 75 BPM | HEIGHT: 69 IN | OXYGEN SATURATION: 96 % | WEIGHT: 199.06 LBS | BODY MASS INDEX: 29.48 KG/M2

## 2022-12-14 DIAGNOSIS — I10 ESSENTIAL HYPERTENSION: Chronic | ICD-10-CM

## 2022-12-14 DIAGNOSIS — Z23 NEED FOR VACCINATION: ICD-10-CM

## 2022-12-14 DIAGNOSIS — R97.20 ELEVATED PSA: Chronic | ICD-10-CM

## 2022-12-14 DIAGNOSIS — Z00.00 ENCOUNTER FOR PREVENTIVE HEALTH EXAMINATION: Primary | ICD-10-CM

## 2022-12-14 DIAGNOSIS — N40.0 BENIGN PROSTATIC HYPERPLASIA: ICD-10-CM

## 2022-12-14 PROCEDURE — G0439 PR MEDICARE ANNUAL WELLNESS SUBSEQUENT VISIT: ICD-10-PCS | Mod: S$GLB,,, | Performed by: NURSE PRACTITIONER

## 2022-12-14 PROCEDURE — 1160F PR REVIEW ALL MEDS BY PRESCRIBER/CLIN PHARMACIST DOCUMENTED: ICD-10-PCS | Mod: CPTII,S$GLB,, | Performed by: NURSE PRACTITIONER

## 2022-12-14 PROCEDURE — 4010F ACE/ARB THERAPY RXD/TAKEN: CPT | Mod: CPTII,S$GLB,, | Performed by: NURSE PRACTITIONER

## 2022-12-14 PROCEDURE — 1101F PT FALLS ASSESS-DOCD LE1/YR: CPT | Mod: CPTII,S$GLB,, | Performed by: NURSE PRACTITIONER

## 2022-12-14 PROCEDURE — 1126F PR PAIN SEVERITY QUANTIFIED, NO PAIN PRESENT: ICD-10-PCS | Mod: CPTII,S$GLB,, | Performed by: NURSE PRACTITIONER

## 2022-12-14 PROCEDURE — 4010F PR ACE/ARB THEARPY RXD/TAKEN: ICD-10-PCS | Mod: CPTII,S$GLB,, | Performed by: NURSE PRACTITIONER

## 2022-12-14 PROCEDURE — 91312 COVID-19, MRNA, LNP-S, BIVALENT BOOSTER, PF, 30 MCG/0.3 ML DOSE: ICD-10-PCS | Mod: S$GLB,,, | Performed by: NURSE PRACTITIONER

## 2022-12-14 PROCEDURE — 99999 PR PBB SHADOW E&M-EST. PATIENT-LVL V: ICD-10-PCS | Mod: PBBFAC,,, | Performed by: NURSE PRACTITIONER

## 2022-12-14 PROCEDURE — 3288F PR FALLS RISK ASSESSMENT DOCUMENTED: ICD-10-PCS | Mod: CPTII,S$GLB,, | Performed by: NURSE PRACTITIONER

## 2022-12-14 PROCEDURE — 3078F DIAST BP <80 MM HG: CPT | Mod: CPTII,S$GLB,, | Performed by: NURSE PRACTITIONER

## 2022-12-14 PROCEDURE — 1126F AMNT PAIN NOTED NONE PRSNT: CPT | Mod: CPTII,S$GLB,, | Performed by: NURSE PRACTITIONER

## 2022-12-14 PROCEDURE — 1170F FXNL STATUS ASSESSED: CPT | Mod: CPTII,S$GLB,, | Performed by: NURSE PRACTITIONER

## 2022-12-14 PROCEDURE — 3078F PR MOST RECENT DIASTOLIC BLOOD PRESSURE < 80 MM HG: ICD-10-PCS | Mod: CPTII,S$GLB,, | Performed by: NURSE PRACTITIONER

## 2022-12-14 PROCEDURE — 99999 PR PBB SHADOW E&M-EST. PATIENT-LVL V: CPT | Mod: PBBFAC,,, | Performed by: NURSE PRACTITIONER

## 2022-12-14 PROCEDURE — 0124A COVID-19, MRNA, LNP-S, BIVALENT BOOSTER, PF, 30 MCG/0.3 ML DOSE: CPT | Mod: PBBFAC | Performed by: NURSE PRACTITIONER

## 2022-12-14 PROCEDURE — 1160F RVW MEDS BY RX/DR IN RCRD: CPT | Mod: CPTII,S$GLB,, | Performed by: NURSE PRACTITIONER

## 2022-12-14 PROCEDURE — 1101F PR PT FALLS ASSESS DOC 0-1 FALLS W/OUT INJ PAST YR: ICD-10-PCS | Mod: CPTII,S$GLB,, | Performed by: NURSE PRACTITIONER

## 2022-12-14 PROCEDURE — 1170F PR FUNCTIONAL STATUS ASSESSED: ICD-10-PCS | Mod: CPTII,S$GLB,, | Performed by: NURSE PRACTITIONER

## 2022-12-14 PROCEDURE — 3288F FALL RISK ASSESSMENT DOCD: CPT | Mod: CPTII,S$GLB,, | Performed by: NURSE PRACTITIONER

## 2022-12-14 PROCEDURE — 1159F MED LIST DOCD IN RCRD: CPT | Mod: CPTII,S$GLB,, | Performed by: NURSE PRACTITIONER

## 2022-12-14 PROCEDURE — 1159F PR MEDICATION LIST DOCUMENTED IN MEDICAL RECORD: ICD-10-PCS | Mod: CPTII,S$GLB,, | Performed by: NURSE PRACTITIONER

## 2022-12-14 PROCEDURE — 3074F PR MOST RECENT SYSTOLIC BLOOD PRESSURE < 130 MM HG: ICD-10-PCS | Mod: CPTII,S$GLB,, | Performed by: NURSE PRACTITIONER

## 2022-12-14 PROCEDURE — G0439 PPPS, SUBSEQ VISIT: HCPCS | Mod: S$GLB,,, | Performed by: NURSE PRACTITIONER

## 2022-12-14 PROCEDURE — 3074F SYST BP LT 130 MM HG: CPT | Mod: CPTII,S$GLB,, | Performed by: NURSE PRACTITIONER

## 2022-12-14 PROCEDURE — 91312 COVID-19, MRNA, LNP-S, BIVALENT BOOSTER, PF, 30 MCG/0.3 ML DOSE: CPT | Mod: S$GLB,,, | Performed by: NURSE PRACTITIONER

## 2022-12-14 RX ORDER — TAMSULOSIN HYDROCHLORIDE 0.4 MG/1
0.8 CAPSULE ORAL DAILY
Qty: 60 CAPSULE | Refills: 0 | Status: SHIPPED | OUTPATIENT
Start: 2022-12-14 | End: 2023-01-13

## 2022-12-14 NOTE — PROGRESS NOTES
"  Jayro Mattson presented for a  Medicare AWV and comprehensive Health Risk Assessment today. The following components were reviewed and updated:    Medical history  Family History  Social history  Allergies and Current Medications  Health Risk Assessment  Health Maintenance  Care Team     ** See Completed Assessments for Annual Wellness Visit within the encounter summary.**     The following assessments were completed:  Living Situation  CAGE  Depression Screening  Timed Get Up and Go  Whisper Test  Cognitive Function Screening(unable to complete clock)  Nutrition Screening  ADL Screening  PAQ Screening    Vitals:    12/14/22 1030   BP: 126/74   BP Location: Left arm   Patient Position: Sitting   BP Method: Medium (Manual)   Pulse: 75   SpO2: 96%   Weight: 90.3 kg (199 lb 1.2 oz)   Height: 5' 9" (1.753 m)     Body mass index is 29.4 kg/m².  Physical Exam  Vitals reviewed.   Constitutional:       Appearance: Normal appearance.   Cardiovascular:      Rate and Rhythm: Normal rate and regular rhythm.      Pulses: Normal pulses.      Heart sounds: Normal heart sounds.   Pulmonary:      Effort: Pulmonary effort is normal. No respiratory distress.      Breath sounds: Normal breath sounds.   Skin:     General: Skin is warm and dry.   Neurological:      Mental Status: He is alert and oriented to person, place, and time.   Psychiatric:         Mood and Affect: Mood normal.         Behavior: Behavior normal.         Judgment: Judgment normal.     Diagnoses and health risks identified today and associated recommendations/orders:    1. Encounter for preventive health examination  Reviewed and discussed health maintenance.      2. Essential hypertension  Stable- continue current treatment and follow up routinely with PCP     3. Elevated PSA  Stable- continue current treatment and follow up routinely with PCP     4. Benign prostatic hyperplasia  Stable- continue current treatment and follow up routinely with PCP   - tamsulosin " (FLOMAX) 0.4 mg Cap; Take 2 capsules (0.8 mg total) by mouth once daily.  Dispense: 60 capsule; Refill: 0    5. Need for vaccination  - COVID-19-MRNA-(PF)(Pfizer Omicron) Vaccine    Review for Opioid Screening: Pt does not have Rx for Opioids  Review for Substance Use Disorders: Patient does not use substance      Provided Jayro with a 5-10 year written screening schedule and personal prevention plan. Recommendations were developed using the USPSTF age appropriate recommendations. Education, counseling, and referrals were provided as needed. After Visit Summary printed and given to patient which includes a list of additional screenings\tests needed.    I offered to discuss advanced care planning, including how to pick a person who would make decisions for you if you were unable to make them for yourself, called a health care power of , and what kind of decisions you might make such as use of life sustaining treatments such as ventilators and tube feeding when faced with a life limiting illness recorded on a living will that they will need to know. (How you want to be cared for as you near the end of your natural life)     X Patient is interested in learning more about how to make advanced directives.  I provided them paperwork and offered to discuss this with them.    Caitlin Lora NP

## 2022-12-14 NOTE — PATIENT INSTRUCTIONS
Counseling and Referral of Other Preventative  (Italic type indicates deductible and co-insurance are waived)    Patient Name: Jayro Mattson  Today's Date: 12/14/2022    Health Maintenance       Date Due Completion Date    TETANUS VACCINE Never done ---    Shingles Vaccine (2 of 3) 01/09/2015 11/14/2014    COVID-19 Vaccine (4 - Booster for Pfizer series) 09/23/2022 7/29/2022    Lipid Panel 07/28/2025 7/28/2020    Colorectal Cancer Screening 09/26/2026 9/26/2019        No orders of the defined types were placed in this encounter.      The following information is provided to all patients.  This information is to help you find resources for any of the problems found today that may be affecting your health:                Living healthy guide: www.[facility].[place].gov      Understanding Diabetes: www.diabetes.org      Eating healthy: www.cdc.gov/healthyweight      [facility] home safety checklist: www.cdc.gov/steadi/patient.html      Agency on Aging: www.goea.[place].gov      Alcoholics anonymous (AA): www.aa.org      Physical Activity: www.riki.nih.gov/vy5sphp      Tobacco use: www.quitwithusla.org

## 2023-06-12 ENCOUNTER — OFFICE VISIT (OUTPATIENT)
Dept: ORTHOPEDICS | Facility: CLINIC | Age: 78
End: 2023-06-12
Payer: MEDICARE

## 2023-06-12 DIAGNOSIS — M65.30 TRIGGER FINGER, UNSPECIFIED FINGER, UNSPECIFIED LATERALITY: ICD-10-CM

## 2023-06-12 PROCEDURE — 1101F PR PT FALLS ASSESS DOC 0-1 FALLS W/OUT INJ PAST YR: ICD-10-PCS | Mod: HCNC,CPTII,S$GLB, | Performed by: ORTHOPAEDIC SURGERY

## 2023-06-12 PROCEDURE — 3288F FALL RISK ASSESSMENT DOCD: CPT | Mod: HCNC,CPTII,S$GLB, | Performed by: ORTHOPAEDIC SURGERY

## 2023-06-12 PROCEDURE — 99203 PR OFFICE/OUTPT VISIT, NEW, LEVL III, 30-44 MIN: ICD-10-PCS | Mod: HCNC,S$GLB,, | Performed by: ORTHOPAEDIC SURGERY

## 2023-06-12 PROCEDURE — 3044F HG A1C LEVEL LT 7.0%: CPT | Mod: HCNC,CPTII,S$GLB, | Performed by: ORTHOPAEDIC SURGERY

## 2023-06-12 PROCEDURE — 99999 PR PBB SHADOW E&M-EST. PATIENT-LVL III: CPT | Mod: PBBFAC,HCNC,, | Performed by: ORTHOPAEDIC SURGERY

## 2023-06-12 PROCEDURE — 1126F PR PAIN SEVERITY QUANTIFIED, NO PAIN PRESENT: ICD-10-PCS | Mod: HCNC,CPTII,S$GLB, | Performed by: ORTHOPAEDIC SURGERY

## 2023-06-12 PROCEDURE — 1126F AMNT PAIN NOTED NONE PRSNT: CPT | Mod: HCNC,CPTII,S$GLB, | Performed by: ORTHOPAEDIC SURGERY

## 2023-06-12 PROCEDURE — 99999 PR PBB SHADOW E&M-EST. PATIENT-LVL III: ICD-10-PCS | Mod: PBBFAC,HCNC,, | Performed by: ORTHOPAEDIC SURGERY

## 2023-06-12 PROCEDURE — 1101F PT FALLS ASSESS-DOCD LE1/YR: CPT | Mod: HCNC,CPTII,S$GLB, | Performed by: ORTHOPAEDIC SURGERY

## 2023-06-12 PROCEDURE — 1159F PR MEDICATION LIST DOCUMENTED IN MEDICAL RECORD: ICD-10-PCS | Mod: HCNC,CPTII,S$GLB, | Performed by: ORTHOPAEDIC SURGERY

## 2023-06-12 PROCEDURE — 3044F PR MOST RECENT HEMOGLOBIN A1C LEVEL <7.0%: ICD-10-PCS | Mod: HCNC,CPTII,S$GLB, | Performed by: ORTHOPAEDIC SURGERY

## 2023-06-12 PROCEDURE — 3288F PR FALLS RISK ASSESSMENT DOCUMENTED: ICD-10-PCS | Mod: HCNC,CPTII,S$GLB, | Performed by: ORTHOPAEDIC SURGERY

## 2023-06-12 PROCEDURE — 1159F MED LIST DOCD IN RCRD: CPT | Mod: HCNC,CPTII,S$GLB, | Performed by: ORTHOPAEDIC SURGERY

## 2023-06-12 PROCEDURE — 99203 OFFICE O/P NEW LOW 30 MIN: CPT | Mod: HCNC,S$GLB,, | Performed by: ORTHOPAEDIC SURGERY

## 2023-06-12 NOTE — PROGRESS NOTES
6/12/2023    Chief Complaint:  Chief Complaint   Patient presents with    Left Hand - Pain    Right Hand - Pain       HPI:  Jayro Mattson is a 77 y.o. male, who presents to clinic today has a history of bilateral thumb and multiple trigger catching.  He states that this is not painful but occasionally it does affect his ability to hold objects.  He is here today for evaluation and treatment    PMHX:  Past Medical History:   Diagnosis Date    Allergy     BPH (benign prostatic hyperplasia)     Diverticulitis     Diverticulosis 3/17/2006    Seen at colonoscopy.    Elevated PSA     Glaucoma     LEGALLY BLIND    HEARING LOSS     Hypertension     Kidney stone        PSHX:  Past Surgical History:   Procedure Laterality Date    COLONOSCOPY  3/17/2006  [facility]    Diverticulosis.  Anal papilla(e) were hypertrophied.     COLONOSCOPY  4/16/2012  [facility]    Diverticulosis.  Hypertrophied anal papilla.    COLONOSCOPY N/A 9/26/2019    Procedure: COLONOSCOPY;  Surgeon: Gino Nance Jr., MD;  Location: [facility];  Service: Endoscopy;  Laterality: N/A;    CYSTOSCOPY      EYE SURGERY      bilateral PHACO with IOL    GLAUCOMA SURGERY      x 3/ one with cataract surgery       FMHX:  Family History   Problem Relation Age of Onset    Diabetes Mother     Diabetes Father     Heart disease Brother     Cancer Brother 66        pancreatic    Diabetes Sister     Prostate cancer Neg Hx        SOCHX:  Social History     Tobacco Use    Smoking status: Never    Smokeless tobacco: Never   Substance Use Topics    Alcohol use: No       ALLERGIES:  No known allergies    CURRENT MEDICATIONS:  Current Outpatient Medications on File Prior to Visit   Medication Sig Dispense Refill    cholecalciferol, vitamin D3, 25 mcg (1,000 unit) Chew Take 1 tablet by mouth once daily. Every day      coenzyme Q10 100 mg capsule Take 1 capsule by mouth Daily. Every day      dorzolamide-timolol (COSOPT) 2-0.5 % ophthalmic solution Place 1 drop into both eyes nightly.       fish oil-omega-3 fatty acids 300-1,000 mg capsule Take 2 g by mouth once daily.      fluorometholone 0.1% (FML) 0.1 % DrpS Place 1 drop into both eyes nightly.      latanoprost 0.005 % ophthalmic solution Place 1 drop into both eyes every evening.      losartan (COZAAR) 50 MG tablet TAKE 1 TABLET EVERY DAY 90 tablet 3    tamsulosin (FLOMAX) 0.4 mg Cap Take 2 capsules (0.8 mg total) by mouth once daily. 180 capsule 3     No current facility-administered medications on file prior to visit.       REVIEW OF SYSTEMS:  Review of Systems   Constitutional: Negative.    HENT: Negative.     Eyes: Negative.    Respiratory: Negative.     Cardiovascular: Negative.    Gastrointestinal: Negative.    Genitourinary: Negative.    Musculoskeletal:  Positive for joint pain.   Skin: Negative.    Neurological:  Positive for weakness.   Endo/Heme/Allergies: Negative.    Psychiatric/Behavioral: Negative.       GENERAL PHYSICAL EXAM:   There were no vitals taken for this visit.   GEN: well developed, well nourished, no acute distress   HENT: Normocephalic, atraumatic   EYES: No discharge, conjunctiva normal   NECK: Supple, non-tender   PULM: No wheezing, no respiratory distress   CV: RRR   ABD: Soft, non-tender    ORTHO EXAM:   Examination of bilateral hands reveals that there is no edema.  There are no major skin changes.  Palpation does not produce significant areas of tenderness.  Is able to flex and extend all fingers but he does have mild triggering of bilateral thumbs as well as the right index and left ring fingers.  He does report grossly intact sensation and has capillary refill less than 2 seconds    RADIOLOGY:   None    ASSESSMENT:   Bilateral thumb triggering, right index and left ring finger triggers    PLAN:  1. I have discussed treatment with the patient.  I have discussed the possibility of attempting steroid injections but the patient states that he does not have enough pain to consider doing any treatments at this  time    2.  He will follow up with me on a p.r.n. basis

## 2023-08-15 DIAGNOSIS — N40.0 BENIGN PROSTATIC HYPERPLASIA: ICD-10-CM

## 2023-08-15 RX ORDER — TAMSULOSIN HYDROCHLORIDE 0.4 MG/1
0.8 CAPSULE ORAL DAILY
Qty: 180 CAPSULE | Refills: 3 | Status: SHIPPED | OUTPATIENT
Start: 2023-08-15

## 2023-08-15 NOTE — TELEPHONE ENCOUNTER
----- Message from Barbara Live sent at 8/15/2023 11:22 AM CDT -----  Contact: Pt  Type:  RX Refill Request    Who Called: Pt  Refill or New Rx:Refill  RX Name and Strength:tamsulosin (FLOMAX) 0.4 mg Cap  How is the patient currently taking it? (ex. 1XDay): 1XDay  Is this a 30 day or 90 day RX:30 day  Preferred Pharmacy with phone number:  [facility]/pharmacy #[id number] - [place] [address] [facility] AT [address] [address]  Phone: [phone] Fax: [phone]      Local or Mail Order:Local3  Ordering Provider:Franko Salas  Would the patient rather a call back or a response via [facility]? call  Best Call Back Number:[phone]  Additional Information: Pt is requesting this prescription sent to [facility]. He would like his refill sent to [facility].

## 2024-01-25 ENCOUNTER — TELEPHONE (OUTPATIENT)
Dept: FAMILY MEDICINE | Facility: CLINIC | Age: 79
End: 2024-01-25
Payer: MEDICARE

## 2024-01-25 NOTE — TELEPHONE ENCOUNTER
----- Message from Darin Manzano sent at 1/25/2024  1:06 PM CST -----  Type:  Sooner Appointment Request    Caller is requesting a sooner appointment.  Caller declined first available appointment listed below.  Caller will not accept being placed on the waitlist and is requesting a message be sent to doctor.    Name of Caller:  pt  When is the first available appointment?  None  Symptoms:  [facility] body pains  Would the patient rather a call back or a response via [facility]? call  Best Call Back Number:   [phone]  Additional Information:  please call and advise--thank you

## 2024-02-06 ENCOUNTER — TELEPHONE (OUTPATIENT)
Dept: NEUROLOGY | Facility: CLINIC | Age: 79
End: 2024-02-06
Payer: MEDICARE

## 2024-02-06 NOTE — TELEPHONE ENCOUNTER
Spoke to the pt, appt scheduled on 5/1/24 at 1100 with Grace Cabrales for muscle spasms and tingling to hands and feet.  Appt slip mailed to the pt, per his request. Date, time and location discussed.

## 2024-03-09 ENCOUNTER — OFFICE VISIT (OUTPATIENT)
Dept: URGENT CARE | Facility: CLINIC | Age: 79
End: 2024-03-09
Payer: MEDICARE

## 2024-03-09 VITALS
DIASTOLIC BLOOD PRESSURE: 97 MMHG | SYSTOLIC BLOOD PRESSURE: 147 MMHG | OXYGEN SATURATION: 98 % | WEIGHT: 198.81 LBS | RESPIRATION RATE: 15 BRPM | HEIGHT: 71 IN | BODY MASS INDEX: 27.83 KG/M2 | TEMPERATURE: 99 F | HEART RATE: 78 BPM

## 2024-03-09 DIAGNOSIS — R21 SCROTAL RASH: ICD-10-CM

## 2024-03-09 DIAGNOSIS — N39.498 OTHER URINARY INCONTINENCE: Primary | ICD-10-CM

## 2024-03-09 PROCEDURE — 99213 OFFICE O/P EST LOW 20 MIN: CPT | Mod: S$GLB,,, | Performed by: NURSE PRACTITIONER

## 2024-03-09 RX ORDER — CLOTRIMAZOLE AND BETAMETHASONE DIPROPIONATE 10; .64 MG/G; MG/G
CREAM TOPICAL 2 TIMES DAILY
Qty: 45 G | Refills: 0 | Status: SHIPPED | OUTPATIENT
Start: 2024-03-09

## 2024-03-09 RX ORDER — IBUPROFEN 100 MG/5ML
1000 SUSPENSION, ORAL (FINAL DOSE FORM) ORAL DAILY
COMMUNITY

## 2024-03-09 NOTE — PATIENT INSTRUCTIONS

## 2024-03-09 NOTE — PROGRESS NOTES
"Subjective:      Patient ID: Jayro Mattson is a 78 y.o. male.    Vitals:  height is 5' 11" (1.803 m) and weight is 90.2 kg (198 lb 12.8 oz). His oral temperature is 98.8 °F (37.1 °C). His blood pressure is 147/97 (abnormal) and his pulse is 78. His respiration is 15 and oxygen saturation is 98%.     Chief Complaint: Urinary Incontinence    Pt reports Frequency and Urinary incontinence but stated it started happening only on the Right side of the groin area leakage for 2-3 weeks now. Patient does have an Enlarged prostate . Patient denies dysuria     Urinary Frequency   This is a new problem. The current episode started 1 to 4 weeks ago. The problem occurs every urination. The problem has been gradually worsening. Associated symptoms include frequency. Pertinent negatives include no urgency, vomiting or weight loss. He has tried nothing for the symptoms. The treatment provided no relief. There is no history of catheterization, diabetes insipidus, diabetes mellitus, genitourinary reflux, hypertension, kidney stones, recurrent UTIs, a single kidney, STD or urinary stasis.       Gastrointestinal:  Negative for vomiting.   Genitourinary:  Positive for frequency. Negative for urgency.      Objective:     Physical Exam   Constitutional: He is oriented to person, place, and time. He appears well-developed.   HENT:   Head: Normocephalic and atraumatic.   Ears:   Right Ear: External ear normal.   Left Ear: External ear normal.   Nose: Nose normal.   Mouth/Throat: Mucous membranes are normal.   Eyes: Conjunctivae and lids are normal.   Neck: Trachea normal. Neck supple.   Cardiovascular: Normal rate, regular rhythm and normal heart sounds.   Pulmonary/Chest: Effort normal and breath sounds normal. No respiratory distress.   Abdominal: Normal appearance and bowel sounds are normal. He exhibits no distension and no mass. Soft. There is no abdominal tenderness.   Genitourinary:    Testes normal.   penile rash. Right epididymis " is/has Normal. Left epididymis is/has normal.         Comments: Patient with rash to right side of scrotum and groin c/w irritation from urinary incontinence     Musculoskeletal: Normal range of motion.         General: Normal range of motion.   Neurological: He is alert and oriented to person, place, and time. He has normal strength.   Skin: Skin is warm, dry, intact, not diaphoretic and not pale.   Psychiatric: His speech is normal and behavior is normal. Judgment and thought content normal.   Nursing note and vitals reviewed.      Assessment:     1. Other urinary incontinence    2. Scrotal rash      Results for orders placed or performed in visit on 05/22/23   CBC Auto Differential   Result Value Ref Range    WBC 4.40 3.90 - 12.70 K/uL    RBC 4.19 (L) 4.60 - 6.20 M/uL    Hemoglobin 12.4 (L) 14.0 - 18.0 g/dL    Hematocrit 38.1 (L) 40.0 - 54.0 %    MCV 91 82 - 98 fL    MCH 29.6 27.0 - 31.0 pg    MCHC 32.5 32.0 - 36.0 g/dL    RDW 13.3 11.5 - 14.5 %    Platelets 176 150 - 450 K/uL    MPV 11.9 9.2 - 12.9 fL    Immature Granulocytes 0.2 0.0 - 0.5 %    Gran # (ANC) 2.4 1.8 - 7.7 K/uL    Immature Grans (Abs) 0.01 0.00 - 0.04 K/uL    Lymph # 1.2 1.0 - 4.8 K/uL    Mono # 0.4 0.3 - 1.0 K/uL    Eos # 0.4 0.0 - 0.5 K/uL    Baso # 0.04 0.00 - 0.20 K/uL    nRBC 0 0 /100 WBC    Gran % 54.5 38.0 - 73.0 %    Lymph % 26.4 18.0 - 48.0 %    Mono % 10.0 4.0 - 15.0 %    Eosinophil % 8.0 0.0 - 8.0 %    Basophil % 0.9 0.0 - 1.9 %    Differential Method Automated    Comprehensive Metabolic Panel   Result Value Ref Range    Sodium 138 136 - 145 mmol/L    Potassium 3.9 3.5 - 5.1 mmol/L    Chloride 105 95 - 110 mmol/L    CO2 29 22 - 31 mmol/L    Glucose 94 70 - 110 mg/dL    BUN 18 9 - 21 mg/dL    Creatinine 0.95 0.50 - 1.40 mg/dL    Calcium 8.7 8.4 - 10.2 mg/dL    Total Protein 7.2 6.0 - 8.4 g/dL    Albumin 4.0 3.5 - 5.2 g/dL    Total Bilirubin 0.6 0.2 - 1.3 mg/dL    Alkaline Phosphatase 84 38 - 145 U/L    AST 28 17 - 59 U/L    ALT 16 0 - 50  U/L    Anion Gap 4 mmol/L    eGFR >60 >60 mL/min/1.73 m^2   Hemoglobin A1C   Result Value Ref Range    Hemoglobin A1C 5.4 0.0 - 5.6 %    Estimated Avg Glucose 108 68 - 131 mg/dL   Lipid Panel   Result Value Ref Range    Cholesterol 181 120 - 199 mg/dL    Triglycerides 50 30 - 150 mg/dL    HDL 51 40 - 75 mg/dL    LDL Cholesterol 120.0 63.0 - 159.0 mg/dL    HDL/Cholesterol Ratio 28.2 20.0 - 50.0 %    Total Cholesterol/HDL Ratio 3.5 2.0 - 5.0    Non-HDL Cholesterol 130 mg/dL   Prostate Specific Antigen, Diagnostic   Result Value Ref Range    PSA Diagnostic 7.8 (H) 0.00 - 4.00 ng/mL   TSH   Result Value Ref Range    TSH 2.210 0.400 - 4.000 uIU/mL   T4, Free   Result Value Ref Range    Free T4 1.21 0.78 - 2.19 ng/dL   Magnesium   Result Value Ref Range    Magnesium 2.1 1.6 - 2.6 mg/dL   Urinalysis   Result Value Ref Range    Specimen UA Urine, Clean Catch     Color, UA Yellow Yellow, Straw, Raquel    Appearance, UA Clear Clear    pH, UA 6.5 5.0 - 8.0    Specific Gravity, UA 1.020 1.005 - 1.030    Protein, UA Negative Negative    Glucose, UA Negative Negative    Ketones, UA Negative Negative    Bilirubin (UA) Negative Negative    Occult Blood UA Negative Negative    Nitrite, UA Negative Negative    Urobilinogen, UA 1.0 <2.0 EU/dL    Leukocytes, UA Negative Negative   Urinalysis Microscopic   Result Value Ref Range    RBC, UA 1 0 - 4 /hpf    WBC, UA 2 0 - 5 /hpf    Bacteria Negative Negative /hpf    Hyaline Casts, UA 1 0 - 1 /lpf    Microscopic Comment SEE COMMENT          Plan:       Other urinary incontinence    Scrotal rash  -     clotrimazole-betamethasone 1-0.05% (LOTRISONE) cream; Apply topically 2 (two) times daily.  Dispense: 45 g; Refill: 0      Patient Instructions   INSTRUCTIONS:  - Rest.  - Drink plenty of fluids.  - Take Tylenol and/or Ibuprofen as directed as needed for fever/pain.  Do not take more than the recommended dose.  - follow up with your PCP within the next 1-2 weeks as needed.  - You must  understand that you have received an Urgent Care treatment only and that you may be released before all of your medical problems are known or treated.   - You, the patient, will arrange for follow up care as instructed.   - If your condition worsens or fails to improve we recommend that you receive another evaluation at the ER immediately or contact your PCP to discuss your concerns.   - You can call [phone] or [phone] to help schedule an appointment with the appropriate provider.     -If you smoke cigarettes, it would be beneficial for you to stop.      Monitor for new or worsening symptoms    OTC for symptom control    Recommend follow up with PCP/Pediatrician if symptoms are worsening     Patient was advised to follow up with his current Urologist

## 2024-04-19 ENCOUNTER — OFFICE VISIT (OUTPATIENT)
Dept: URGENT CARE | Facility: CLINIC | Age: 79
End: 2024-04-19
Payer: MEDICARE

## 2024-04-19 VITALS
HEART RATE: 81 BPM | WEIGHT: 198 LBS | TEMPERATURE: 98 F | OXYGEN SATURATION: 98 % | RESPIRATION RATE: 15 BRPM | DIASTOLIC BLOOD PRESSURE: 91 MMHG | SYSTOLIC BLOOD PRESSURE: 148 MMHG | BODY MASS INDEX: 27.72 KG/M2 | HEIGHT: 71 IN

## 2024-04-19 DIAGNOSIS — R05.9 COUGH, UNSPECIFIED TYPE: Primary | ICD-10-CM

## 2024-04-19 DIAGNOSIS — T78.40XA ALLERGY, INITIAL ENCOUNTER: ICD-10-CM

## 2024-04-19 LAB
CTP QC/QA: YES
CTP QC/QA: YES
POC MOLECULAR INFLUENZA A AGN: NEGATIVE
POC MOLECULAR INFLUENZA B AGN: NEGATIVE
SARS-COV-2 AG RESP QL IA.RAPID: NEGATIVE

## 2024-04-19 PROCEDURE — 87811 SARS-COV-2 COVID19 W/OPTIC: CPT | Mod: QW,S$GLB,, | Performed by: NURSE PRACTITIONER

## 2024-04-19 PROCEDURE — 87502 INFLUENZA DNA AMP PROBE: CPT | Mod: QW,S$GLB,, | Performed by: NURSE PRACTITIONER

## 2024-04-19 PROCEDURE — 99213 OFFICE O/P EST LOW 20 MIN: CPT | Mod: S$GLB,,, | Performed by: NURSE PRACTITIONER

## 2024-04-19 RX ORDER — BENZONATATE 100 MG/1
100 CAPSULE ORAL 3 TIMES DAILY PRN
Qty: 20 CAPSULE | Refills: 0 | Status: SHIPPED | OUTPATIENT
Start: 2024-04-19 | End: 2024-04-29

## 2024-04-19 NOTE — PROGRESS NOTES
"Subjective:      Patient ID: Jayro Mattson is a 78 y.o. male.    Vitals:  height is 5' 11" (1.803 m) and weight is 89.8 kg (198 lb). His oral temperature is 98.3 °F (36.8 °C). His blood pressure is 148/91 (abnormal) and his pulse is 81. His respiration is 15 and oxygen saturation is 98%.     Chief Complaint: Cough    Patient reports Cough with Coughing spells at night/ difficulty inhaling, Runny nose ,Congestion , Sore throat . Patient has been taking Robitussin and Claritin     Cough  This is a new problem. The problem has been gradually worsening. The problem occurs constantly. The cough is Non-productive. Associated symptoms include nasal congestion and a sore throat. Pertinent negatives include no chest pain, chills, ear congestion, ear pain, fever, headaches, heartburn, hemoptysis, myalgias, postnasal drip, rash, rhinorrhea, shortness of breath, sweats, weight loss or wheezing. The symptoms are aggravated by lying down. He has tried OTC cough suppressant for the symptoms. The treatment provided no relief. There is no history of asthma, bronchiectasis, bronchitis, COPD, emphysema, environmental allergies or pneumonia.       Constitution: Negative for chills and fever.   HENT:  Positive for sore throat. Negative for ear pain and postnasal drip.    Cardiovascular:  Negative for chest pain.   Respiratory:  Positive for cough. Negative for bloody sputum, shortness of breath and wheezing.    Gastrointestinal:  Negative for heartburn.   Musculoskeletal:  Negative for muscle ache.   Skin:  Negative for rash.   Allergic/Immunologic: Negative for environmental allergies.   Neurological:  Negative for headaches.      Objective:     Physical Exam   Constitutional: He is oriented to person, place, and time. He appears well-developed. He is cooperative.  Non-toxic appearance. He does not appear ill. No distress.   HENT:   Head: Normocephalic and atraumatic.   Ears:   Right Ear: Hearing, tympanic membrane, external ear and " ear canal normal.   Left Ear: Hearing, tympanic membrane, external ear and ear canal normal.   Nose: Nose normal. No mucosal edema, rhinorrhea or nasal deformity. No epistaxis. Right sinus exhibits no maxillary sinus tenderness and no frontal sinus tenderness. Left sinus exhibits no maxillary sinus tenderness and no frontal sinus tenderness.   Mouth/Throat: Uvula is midline, oropharynx is clear and moist and mucous membranes are normal. No trismus in the jaw. Normal dentition. No uvula swelling. No oropharyngeal exudate, posterior oropharyngeal edema or posterior oropharyngeal erythema.   Eyes: Lids are normal. Right conjunctiva is injected. Left conjunctiva is injected. No scleral icterus. Extraocular movement intact periorbital hyperpigmentation   Neck: Trachea normal and phonation normal. Neck supple. No edema present. No erythema present. No neck rigidity present.   Cardiovascular: Normal rate, regular rhythm, normal heart sounds and normal pulses.   Pulmonary/Chest: Effort normal and breath sounds normal. No accessory muscle usage or stridor. No tachypnea and no bradypnea. No respiratory distress. He has no decreased breath sounds. He has no wheezes. He has no rhonchi. He has no rales.   Abdominal: Normal appearance.   Musculoskeletal: Normal range of motion.         General: No deformity. Normal range of motion.   Neurological: He is alert and oriented to person, place, and time. He exhibits normal muscle tone. Coordination normal.   Skin: Skin is warm, dry, intact, not diaphoretic and not pale.   Psychiatric: His speech is normal and behavior is normal. Judgment and thought content normal.   Nursing note and vitals reviewed.      Assessment:     1. Cough, unspecified type    2. Allergy, initial encounter      Results for orders placed or performed in visit on 04/19/24   SARS Coronavirus 2 Antigen, POCT Manual Read   Result Value Ref Range    SARS Coronavirus 2 Antigen Negative Negative      Acceptable Yes    POCT Influenza A/B MOLECULAR   Result Value Ref Range    POC Molecular Influenza A Ag Negative Negative    POC Molecular Influenza B Ag Negative Negative     Acceptable Yes        Plan:       Cough, unspecified type  -     SARS Coronavirus 2 Antigen, POCT Manual Read  -     POCT Influenza A/B MOLECULAR  -     benzonatate (TESSALON) 100 MG capsule; Take 1 capsule (100 mg total) by mouth 3 (three) times daily as needed for Cough.  Dispense: 20 capsule; Refill: 0    Allergy, initial encounter      Patient Instructions   INSTRUCTIONS:  - Rest.  - Drink plenty of fluids.  - Take Tylenol and/or Ibuprofen as directed as needed for fever/pain.  Do not take more than the recommended dose.  - follow up with your PCP within the next 1-2 weeks as needed.  - You must understand that you have received an Urgent Care treatment only and that you may be released before all of your medical problems are known or treated.   - You, the patient, will arrange for follow up care as instructed.   - If your condition worsens or fails to improve we recommend that you receive another evaluation at the ER immediately or contact your PCP to discuss your concerns.   - You can call [phone] or [phone] to help schedule an appointment with the appropriate provider.     -If you smoke cigarettes, it would be beneficial for you to stop.      Monitor for new or worsening symptoms    OTC for symptom control    Recommend follow up with PCP/Pediatrician if symptoms are worsening     Continue Claritin     Follow up with PCP

## 2024-04-19 NOTE — PATIENT INSTRUCTIONS

## 2024-04-30 ENCOUNTER — TELEPHONE (OUTPATIENT)
Dept: NEUROLOGY | Facility: CLINIC | Age: 79
End: 2024-04-30
Payer: MEDICARE

## 2024-04-30 NOTE — TELEPHONE ENCOUNTER
Spoke to the pt, he cancelled his appt for 5/12/24 at 1100 with Grace Cabrales and will call back to reschedule.

## 2024-07-11 ENCOUNTER — TELEPHONE (OUTPATIENT)
Dept: UROLOGY | Facility: CLINIC | Age: 79
End: 2024-07-11
Payer: MEDICARE

## 2024-07-11 NOTE — TELEPHONE ENCOUNTER
Pre appt call   Pt of [facility]  Called pt to confirm location   Pt voiced unknown location was given and unaware is was traveling to [place].  Helped pt arrange another [place] appt as vision is bad and unable to find ride to todays appt  Pt accepted new appt in [place] with NP next week to [facility] as past pt of Dr ABERNATHY  Per pt voiced he had to wait several months for appt   Per pt only can travel to [place].

## 2024-07-18 ENCOUNTER — OFFICE VISIT (OUTPATIENT)
Dept: UROLOGY | Facility: CLINIC | Age: 79
End: 2024-07-18
Payer: MEDICARE

## 2024-07-18 VITALS — HEIGHT: 71 IN | WEIGHT: 194.25 LBS | BODY MASS INDEX: 27.19 KG/M2

## 2024-07-18 DIAGNOSIS — I10 ESSENTIAL HYPERTENSION: ICD-10-CM

## 2024-07-18 DIAGNOSIS — R35.1 BENIGN PROSTATIC HYPERPLASIA WITH NOCTURIA: ICD-10-CM

## 2024-07-18 DIAGNOSIS — R97.20 ELEVATED PSA: Primary | Chronic | ICD-10-CM

## 2024-07-18 DIAGNOSIS — N40.1 BENIGN PROSTATIC HYPERPLASIA WITH NOCTURIA: ICD-10-CM

## 2024-07-18 LAB
BILIRUBIN, UA POC OHS: NEGATIVE
BLOOD, UA POC OHS: NEGATIVE
CLARITY, UA POC OHS: CLEAR
COLOR, UA POC OHS: YELLOW
GLUCOSE, UA POC OHS: NEGATIVE
KETONES, UA POC OHS: NEGATIVE
LEUKOCYTES, UA POC OHS: NEGATIVE
NITRITE, UA POC OHS: NEGATIVE
PH, UA POC OHS: 5.5
PROTEIN, UA POC OHS: NEGATIVE
SPECIFIC GRAVITY, UA POC OHS: 1.02
UROBILINOGEN, UA POC OHS: 0.2

## 2024-07-18 PROCEDURE — 99999 PR PBB SHADOW E&M-EST. PATIENT-LVL III: CPT | Mod: PBBFAC,HCNC,,

## 2024-07-18 RX ORDER — LOSARTAN POTASSIUM 50 MG/1
50 TABLET ORAL DAILY
Qty: 90 TABLET | Refills: 3 | Status: SHIPPED | OUTPATIENT
Start: 2024-07-18 | End: 2025-07-18

## 2024-07-18 RX ORDER — TAMSULOSIN HYDROCHLORIDE 0.4 MG/1
0.8 CAPSULE ORAL DAILY
Qty: 180 CAPSULE | Refills: 3 | Status: SHIPPED | OUTPATIENT
Start: 2024-07-18

## 2024-07-18 NOTE — PROGRESS NOTES
[facility] Note  Staff: CECELIA Bullock    PCP: MD Diego    Chief Complaint: Annual    Subjective:        HPI: Jayro Mattson is a 78 y.o. male NEW PATIENT presents today for annual exam. He was previously established with Dr. Hanna. He has a history of an elevated PSA for greater than 10 years. He has had 2-3 prostate biopsies in the past, all negative. He denies a family history of prostate cancer. His most recent PSA is from 5/22/2023 at 7.8, which is stable for him.    He also has a history of BPH and is currently taking tamsulosin 0.8mg daily. He denies dysuria, hematuria, fever, flank pain, and difficulty urinating. He denies constipation. He also has a history of kidney stones.     Questions asked the pt during ov today:  Urgency: No, urge incontinence? No  NTF: 1-2x night  Dysuria: No  Gross Hematuria:No  Straining:No, Hesistancy:No, Intermittency:No}, Weak stream:Yes    Last PSA Screening:   Lab Results   Component Value Date    PSA 6.5 (H) 07/28/2020    PSA 8.1 (H) 04/22/2014    PSA 7.72 (H) 02/11/2012    PSADIAG 7.8 (H) 05/22/2023    PSADIAG 5.6 (H) 06/14/2021    PSADIAG 5.7 (H) 02/17/2021       History of Kidney Stones?:  Yes    Constipation issues?:  No    REVIEW OF SYSTEMS:  Review of Systems   Constitutional: Negative.  Negative for chills and fever.   HENT: Negative.     Eyes: Negative.    Respiratory: Negative.     Cardiovascular: Negative.    Gastrointestinal: Negative.  Negative for abdominal pain, constipation, diarrhea, nausea and vomiting.   Genitourinary: Negative.  Negative for dysuria, flank pain, frequency, hematuria and urgency.   Musculoskeletal: Negative.  Negative for back pain.   Skin: Negative.    Neurological: Negative.    Endo/Heme/Allergies: Negative.    Psychiatric/Behavioral: Negative.         PMHx:  Past Medical History:   Diagnosis Date    Allergy     BPH (benign prostatic hyperplasia)     Diverticulitis     Diverticulosis 3/17/2006    Seen at  colonoscopy.    Elevated PSA     Glaucoma     LEGALLY BLIND    HEARING LOSS     Hypertension     Kidney stone        PSHx:  Past Surgical History:   Procedure Laterality Date    COLONOSCOPY  3/17/2006  [facility]    Diverticulosis.  Anal papilla(e) were hypertrophied.     COLONOSCOPY  4/16/2012  [facility]    Diverticulosis.  Hypertrophied anal papilla.    COLONOSCOPY N/A 9/26/2019    Procedure: COLONOSCOPY;  Surgeon: Gino Nance Jr., MD;  Location: [facility];  Service: Endoscopy;  Laterality: N/A;    CYSTOSCOPY      EYE SURGERY      bilateral PHACO with IOL    GLAUCOMA SURGERY      x 3/ one with cataract surgery       Fam Hx:   malignancies: No    kidney stones: No     Soc Hx:  , lives in [place]    Allergies:  No known allergies    Medications: reviewed     Objective:   There were no vitals filed for this visit.    Physical Exam  Constitutional:       Appearance: Normal appearance.   HENT:      Head: Normocephalic.      Mouth/Throat:      Mouth: Mucous membranes are moist.   Eyes:      Conjunctiva/sclera: Conjunctivae normal.   Pulmonary:      Effort: Pulmonary effort is normal.   Abdominal:      General: There is no distension.      Palpations: Abdomen is soft.      Tenderness: There is no abdominal tenderness.   Musculoskeletal:         General: Normal range of motion.      Cervical back: Normal range of motion.   Skin:     General: Skin is warm.   Neurological:      Mental Status: He is alert and oriented to person, place, and time.   Psychiatric:         Mood and Affect: Mood normal.         Behavior: Behavior normal.          EXAM performed by me in office today:  pt refuses, will not need [facility]AI in future    LABS REVIEW:  UA today:  Color:Clear, Yellow  Spec. Grav.  1.020  PH  5.5  Negative for leukocytes, nitrates, protein, glucose, ketones, urobili, bili, and blood.    Assessment:       1. Elevated PSA    2. Essential hypertension    3. Benign prostatic hyperplasia          Plan:     Continue tamsulosin  0.8mg daily as prescribed, refills prescribed    F/u annually or as needed    [facility]: Pending    HILDA BullockC

## 2024-12-26 ENCOUNTER — OFFICE VISIT (OUTPATIENT)
Dept: URGENT CARE | Facility: CLINIC | Age: 79
End: 2024-12-26
Payer: MEDICARE

## 2024-12-26 VITALS
BODY MASS INDEX: 28 KG/M2 | OXYGEN SATURATION: 97 % | HEART RATE: 91 BPM | DIASTOLIC BLOOD PRESSURE: 75 MMHG | SYSTOLIC BLOOD PRESSURE: 125 MMHG | RESPIRATION RATE: 20 BRPM | WEIGHT: 200 LBS | HEIGHT: 71 IN | TEMPERATURE: 98 F

## 2024-12-26 DIAGNOSIS — B35.6 TINEA CRURIS: ICD-10-CM

## 2024-12-26 DIAGNOSIS — L73.9 FOLLICULITIS: Primary | ICD-10-CM

## 2024-12-26 PROCEDURE — 99213 OFFICE O/P EST LOW 20 MIN: CPT | Mod: S$GLB,,, | Performed by: PHYSICIAN ASSISTANT

## 2024-12-26 RX ORDER — KETOCONAZOLE 20 MG/G
CREAM TOPICAL DAILY
Qty: 30 G | Refills: 0 | Status: SHIPPED | OUTPATIENT
Start: 2024-12-26

## 2024-12-26 RX ORDER — CEPHALEXIN 500 MG/1
500 CAPSULE ORAL EVERY 12 HOURS
Qty: 14 CAPSULE | Refills: 0 | Status: SHIPPED | OUTPATIENT
Start: 2024-12-26 | End: 2025-01-02

## 2024-12-26 NOTE — PATIENT INSTRUCTIONS
Please review attached instructions.    You must understand that you've received an Urgent Care treatment only and that you may be released before all your medical problems are known or treated. You, the patient, will arrange for follow up care as instructed.  Follow up with your PCP or specialty clinic as directed in the next 1-2 weeks if not improved or as needed.  You may call [phone] to schedule an appointment with the appropriate provider.  If your condition worsens we recommend that you receive another evaluation at the emergency room immediately or contact your primary medical clinics after hours call service to discuss your concerns.    If you were prescribed a narcotic or controlled medication, do not drive or operate heavy equipment or machinery while taking these medications.    If you smoke, please stop smoking.

## 2024-12-26 NOTE — PROGRESS NOTES
"Subjective:      Patient ID: Jayro Mattson is a 79 y.o. male.    Vitals:  height is 5' 11" (1.803 m) and weight is 90.7 kg (200 lb). His oral temperature is 98.1 °F (36.7 °C). His blood pressure is 125/75 and his pulse is 91. His respiration is 20 and oxygen saturation is 97%.     Chief Complaint: Penile Discharge    Pt presents today with c/o penile discharge x's 1 week. Pt has not taken anything for sx. Pt states that there is a little tenderness present. Pain level 0/10.    Provider note:  78 y/o M presents with leaking rash in the right groin that he noticed about a week ago. Says he has to put tissues in the groin to keep his underwear dry. He denies itching. Endorses mild soreness. Denies fever, testicular pain, purulent penile drainage, dysuria, frequency, urgency or abdominal pain.     Penile Discharge  The patient's pertinent negatives include no penile discharge, penile pain, scrotal swelling or testicular pain. This is a new problem. The current episode started 1 to 4 weeks ago. The problem occurs constantly. The problem has been unchanged. The patient is experiencing no pain. Associated symptoms include a rash. Pertinent negatives include no abdominal pain, dysuria, fever, frequency or urgency. The penile discharge was Thick and clear. Nothing aggravates the symptoms. He has tried nothing for the symptoms. The treatment provided no relief. He is not sexually active. No, his partner does not have an STD.       Constitution: Negative for fever.   Gastrointestinal:  Negative for abdominal pain.   Genitourinary:  Negative for dysuria, frequency, urgency, genital trauma, penile discharge, penile pain, scrotal swelling and testicular pain.   Skin:  Positive for rash.      Objective:     Physical Exam   Constitutional: He is cooperative. No distress.   HENT:   Head: Normocephalic and atraumatic.   Ears:   Right Ear: Hearing and external ear normal.   Left Ear: Hearing and external ear normal.   Nose: Nose " normal.   Eyes: Lids are normal. Right conjunctiva is not injected. Left conjunctiva is not injected. No scleral icterus.   Cardiovascular: Normal pulses.   Pulmonary/Chest: Effort normal. No respiratory distress.   Abdominal: Normal appearance.   Genitourinary: penile rash.               Comments: Erythematous patch right groin. Follicles on the scrotum appear swollen with clear drainage.      Neurological: He is alert. He has normal motor skills and normal sensation. He displays no weakness.   Skin: Skin is rash (right groin).   Nursing note and vitals reviewed.      Assessment:     1. Folliculitis    2. Tinea cruris        Plan:           Folliculitis  -     cephALEXin (KEFLEX) 500 MG capsule; Take 1 capsule (500 mg total) by mouth every 12 (twelve) hours. for 7 days  Dispense: 14 capsule; Refill: 0    Tinea cruris  -     ketoconazole (NIZORAL) 2 % cream; Apply topically once daily. AAA  Dispense: 30 g; Refill: 0      Patient Instructions   Please review attached instructions.    You must understand that you've received an Urgent Care treatment only and that you may be released before all your medical problems are known or treated. You, the patient, will arrange for follow up care as instructed.  Follow up with your PCP or specialty clinic as directed in the next 1-2 weeks if not improved or as needed.  You may call [phone] to schedule an appointment with the appropriate provider.  If your condition worsens we recommend that you receive another evaluation at the emergency room immediately or contact your primary medical clinics after hours call service to discuss your concerns.    If you were prescribed a narcotic or controlled medication, do not drive or operate heavy equipment or machinery while taking these medications.    If you smoke, please stop smoking.

## 2025-04-24 ENCOUNTER — OFFICE VISIT (OUTPATIENT)
Dept: OTOLARYNGOLOGY | Facility: CLINIC | Age: 80
End: 2025-04-24
Payer: MEDICARE

## 2025-04-24 VITALS — BODY MASS INDEX: 28.35 KG/M2 | WEIGHT: 203.25 LBS

## 2025-04-24 DIAGNOSIS — H61.21 IMPACTED CERUMEN OF RIGHT EAR: ICD-10-CM

## 2025-04-24 DIAGNOSIS — Z97.4 WEARS HEARING AID IN BOTH EARS: Primary | ICD-10-CM

## 2025-04-24 PROCEDURE — 99999 PR PBB SHADOW E&M-EST. PATIENT-LVL III: CPT | Mod: PBBFAC,,, | Performed by: NURSE PRACTITIONER

## 2025-04-24 NOTE — PROGRESS NOTES
Subjective:       Patient ID: Jayro Mattson is a 79 y.o. male.    Chief Complaint: No chief complaint on file.    Hearing Loss:    Associated symptoms: Tinnitus.       Patient saw me 4 years ago for bilateral sensorineural hearing loss. No otologic history of surgery or trauma.  No significant noise exposure or family history of hearing loss. Patient now wears hearing aids bilaterally.    Patient is referred back today by Dr. More for consultation for cerumen impactions. He denies otalgia, otorrhea, or other ENT symptoms or concerns. He is legally blind. 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     Review of Systems   Constitutional: Negative.    HENT:  Positive for hearing loss and tinnitus.    Eyes:  Positive for visual disturbance.   Respiratory: Negative.     Cardiovascular: Negative.    Gastrointestinal: Negative.    Musculoskeletal: Negative.    Skin: Negative.    Neurological: Negative.    Hematological: Negative.    Psychiatric/Behavioral: Negative.         Objective:      Physical Exam  Vitals and nursing note reviewed.   Constitutional:       General: He is not in acute distress.     Appearance: He is well-developed. He is not ill-appearing.   HENT:      Head: Normocephalic and atraumatic.      Right Ear: Hearing, tympanic membrane, ear canal and external ear normal. No middle ear effusion. Tympanic membrane is not erythematous.      Left Ear: Hearing, tympanic membrane, ear canal and external ear normal.  No middle ear effusion. Tympanic membrane is not erythematous.      Nose: Nose normal.   Eyes:      General: Lids are normal. No scleral icterus.        Right eye: No discharge.         Left eye: No discharge.      Comments: legally blind   Neck:      Trachea: Trachea normal. No tracheal deviation.   Cardiovascular:      Rate and Rhythm: Normal rate.   Pulmonary:      Effort: Pulmonary effort is normal. No respiratory distress.      Breath sounds: No stridor. No wheezing.   Musculoskeletal:          General: Normal range of motion.      Cervical back: Normal range of motion.   Skin:     General: Skin is warm and dry.   Neurological:      Mental Status: He is alert and oriented to person, place, and time.      Coordination: Coordination is intact.      Gait: Gait normal.   Psychiatric:         Attention and Perception: Attention normal.         Mood and Affect: Mood normal.         Speech: Speech normal.         Behavior: Behavior normal. Behavior is cooperative.      SEPARATE PROCEDURE IN OFFICE:   Procedure: Removal of impacted cerumen, bilateral   Pre Procedure Diagnosis: Cerumen Impaction   Post Procedure Diagnosis: Cerumen Impaction   Verbal informed consent in regards to risk of trauma to ear canal, ear drum or hearing, discomfort during procedure and/or inability to remove cerumen impaction in one session or unforeseen events or complications.   No anesthesia.     Procedure in detail:   Ear canal visualized bilateral with appropriate size ear speculum utilizing Operating Head Binocular Otomicroscope   Utilizing the following:  Ring curet was used AU. The impacted cerumen of the ear canals was removed atraumatically. The TM and EAC were then inspected and found to be clear of wax. See description of TMs/EACs in PE above.   Complications: No   Condition: Improved/Good    Assessment:     Cerumen impactions removed AU    Mild to severe SNHL AU, wears hearing aids AU  Plan:     Patient encouraged to return to clinic if symptoms worsen/persist and as needed for further ENT symptoms or concerns.

## 2025-06-23 ENCOUNTER — OFFICE VISIT (OUTPATIENT)
Dept: UROLOGY | Facility: CLINIC | Age: 80
End: 2025-06-23
Payer: MEDICARE

## 2025-06-23 VITALS — HEIGHT: 71 IN | BODY MASS INDEX: 28.11 KG/M2 | WEIGHT: 200.81 LBS

## 2025-06-23 DIAGNOSIS — N13.8 BPH WITH URINARY OBSTRUCTION: ICD-10-CM

## 2025-06-23 DIAGNOSIS — R97.20 ELEVATED PSA: Primary | ICD-10-CM

## 2025-06-23 DIAGNOSIS — N40.1 BPH WITH URINARY OBSTRUCTION: ICD-10-CM

## 2025-06-23 PROCEDURE — G2211 COMPLEX E/M VISIT ADD ON: HCPCS | Mod: S$GLB,,, | Performed by: STUDENT IN AN ORGANIZED HEALTH CARE EDUCATION/TRAINING PROGRAM

## 2025-06-23 PROCEDURE — 1101F PT FALLS ASSESS-DOCD LE1/YR: CPT | Mod: CPTII,S$GLB,, | Performed by: STUDENT IN AN ORGANIZED HEALTH CARE EDUCATION/TRAINING PROGRAM

## 2025-06-23 PROCEDURE — 3288F FALL RISK ASSESSMENT DOCD: CPT | Mod: CPTII,S$GLB,, | Performed by: STUDENT IN AN ORGANIZED HEALTH CARE EDUCATION/TRAINING PROGRAM

## 2025-06-23 PROCEDURE — 99214 OFFICE O/P EST MOD 30 MIN: CPT | Mod: S$GLB,,, | Performed by: STUDENT IN AN ORGANIZED HEALTH CARE EDUCATION/TRAINING PROGRAM

## 2025-06-23 PROCEDURE — 99999 PR PBB SHADOW E&M-EST. PATIENT-LVL III: CPT | Mod: PBBFAC,,, | Performed by: STUDENT IN AN ORGANIZED HEALTH CARE EDUCATION/TRAINING PROGRAM

## 2025-06-23 PROCEDURE — 1126F AMNT PAIN NOTED NONE PRSNT: CPT | Mod: CPTII,S$GLB,, | Performed by: STUDENT IN AN ORGANIZED HEALTH CARE EDUCATION/TRAINING PROGRAM

## 2025-06-23 PROCEDURE — 1159F MED LIST DOCD IN RCRD: CPT | Mod: CPTII,S$GLB,, | Performed by: STUDENT IN AN ORGANIZED HEALTH CARE EDUCATION/TRAINING PROGRAM

## 2025-06-23 NOTE — PROGRESS NOTES
"[facility]   Clinic Note    Subjective:     Chief Complaint: Elevated PSA      History of Present Illness    CHIEF COMPLAINT:  Jayro presents today for follow up of elevated PSA.    PSA HISTORY:  He reports a history of elevated PSA levels, with a peak PSA around 12. He has undergone multiple prostate biopsies in the past, all of which were benign. He acknowledges fluctuations in PSA levels over time    URINARY SYMPTOMS:  He reports no new urinary problems. For several years, he experiences improved urination when standing up, with reduced discomfort compared to other positions. He denies current pain or significant urinary difficulties.    MEDICATIONS:  He takes tamsulosin 2 tablets daily.         PSA Diagnostic   Latest Ref Rng 0.00 - 4.00 ng/mL   2/11/2012 7.72 (H)    4/22/2014 8.1 (H)    11/10/2014 7.5 (H)    4/23/2015 7.0 (H)    10/29/2015 6.0 (H)    10/18/2016 6.8 (H)    8/7/2018 6.0 (H)    5/21/2019 6.1 (H)    7/28/2020 6.5 (H)    2/17/2021 5.7 (H)    6/14/2021 5.6 (H)    5/22/2023 7.8 (H)    5/21/2025 10.8 (H)       Past medical, family, surgical and social history reviewed as documented in chart with pertinent positive medical, family, surgical and social history detailed in HPI.    A review systems was conducted with pertinent positive and negative findings documented in HPI.    Objective:     Estimated body mass index is 28.01 kg/m² as calculated from the following:    Height as of this encounter: 5' 11" (1.803 m).    Weight as of this encounter: 91.1 kg (200 lb 13.4 oz).    Vital Signs (Most Recent)       General: No acute distress, well developed.   Head: Normocephalic, atraumatic  CV: no cyanosis  Lungs: normal respiratory effort, no respiratory distress  : 50g no nodules    Labs reviewed below:  Lab Results   Component Value Date    BUN 16 05/21/2025    CREATININE 0.89 05/21/2025    WBC 4.69 05/21/2025    HGB 12.5 (L) 05/21/2025    HCT 37.5 (L) 05/21/2025     05/21/2025    AST 21 " 05/21/2025    ALT 13 05/21/2025    ALKPHOS 82 05/21/2025    ALBUMIN 3.9 05/21/2025    HGBA1C 5.4 05/21/2025        PSA trend reviewed below:  Lab Results   Component Value Date    PSA 10.8 (H) 05/21/2025    PSA 6.5 (H) 07/28/2020    PSA 8.1 (H) 04/22/2014    PSA 7.72 (H) 02/11/2012    PSA 1.4 12/08/2009    PSA 1.1 12/11/2007    PSA 1.7 10/16/2006    PSA 4.4 (H) 02/15/2005    PSA 3.3 03/18/2004    PSADIAG 7.8 (H) 05/22/2023    PSADIAG 5.6 (H) 06/14/2021    PSADIAG 5.7 (H) 02/17/2021    PSADIAG 6.1 (H) 05/21/2019    PSADIAG 6.0 (H) 08/07/2018    PSADIAG 6.8 (H) 10/18/2016    PSADIAG 6.0 (H) 10/29/2015    PSADIAG 7.0 (H) 04/23/2015    PSADIAG 7.5 (H) 11/10/2014        Lab Results   Component Value Date    BLOODUA Negative 07/18/2024    WBCUR Negative 07/18/2024    NITRITE Negative 07/18/2024      Assessment:     1. Elevated PSA    2. BPH with urinary obstruction      Plan:     Assessment & Plan    - Noted elevated PSA, higher than previous results but consistent with history of high PSAs.  - Considered age (over 75) in screening recommendations and discussed age-related guidelines for prostate cancer screening.  - Will monitor PSA trend over next few months before considering further interventions.  - Discussed potential future steps if PSA remains elevated, including prostate MRI and possible biopsy.  - Evaluated current urinary symptoms and determined no immediate need for intervention beyond current Flomax regimen.    - Explained PSA as an indicator of prostate activity, not necessarily a number to treat directly.  - Outlined potential management options for elevated PSA, including monitoring, imaging, biopsy, and treatment modalities.  - Provided information on various procedures available for managing urinary symptoms (e.g., TURP, aquablation, Rezum).    - Ordered PSA test to be repeated in 3 months with follow up at that time.    - Continue Flomax (tamsulosin) 0.4 mg, 2 tablets daily.        The visit today  included increased complexity associated with the care of the episodic problem addressed above as well as managing the longitudinal care of the patient due to the serious and/or complex managed problem(s).      Portions of this note were generated by [employer] dictation services    Roni Olsen MD

## 2025-08-26 DIAGNOSIS — N40.1 BENIGN PROSTATIC HYPERPLASIA WITH NOCTURIA: ICD-10-CM

## 2025-08-26 DIAGNOSIS — R35.1 BENIGN PROSTATIC HYPERPLASIA WITH NOCTURIA: ICD-10-CM

## 2025-08-26 RX ORDER — TAMSULOSIN HYDROCHLORIDE 0.4 MG/1
2 CAPSULE ORAL
Qty: 180 CAPSULE | Refills: 3 | Status: SHIPPED | OUTPATIENT
Start: 2025-08-26